# Patient Record
Sex: FEMALE | Race: WHITE | Employment: FULL TIME | URBAN - METROPOLITAN AREA
[De-identification: names, ages, dates, MRNs, and addresses within clinical notes are randomized per-mention and may not be internally consistent; named-entity substitution may affect disease eponyms.]

---

## 2019-05-18 PROBLEM — F41.9 ANXIETY: Status: ACTIVE | Noted: 2019-05-18

## 2019-05-18 PROBLEM — K21.9 GASTROESOPHAGEAL REFLUX DISEASE WITHOUT ESOPHAGITIS: Status: ACTIVE | Noted: 2019-05-18

## 2019-05-18 PROBLEM — Z80.0 FAMILY HISTORY OF COLON CANCER REQUIRING SCREENING COLONOSCOPY: Status: ACTIVE | Noted: 2019-05-18

## 2019-05-24 PROBLEM — E61.1 IRON DEFICIENCY: Status: ACTIVE | Noted: 2019-05-24

## 2019-09-06 ENCOUNTER — HOSPITAL ENCOUNTER (OUTPATIENT)
Facility: HOSPITAL | Age: 29
Setting detail: HOSPITAL OUTPATIENT SURGERY
Discharge: HOME | End: 2019-09-06
Attending: INTERNAL MEDICINE | Admitting: INTERNAL MEDICINE
Payer: COMMERCIAL

## 2019-09-06 VITALS
WEIGHT: 175 LBS | HEIGHT: 71 IN | RESPIRATION RATE: 14 BRPM | BODY MASS INDEX: 24.5 KG/M2 | HEART RATE: 48 BPM | OXYGEN SATURATION: 100 % | SYSTOLIC BLOOD PRESSURE: 108 MMHG | DIASTOLIC BLOOD PRESSURE: 67 MMHG

## 2019-09-06 DIAGNOSIS — Z12.11 SCREEN FOR COLON CANCER: ICD-10-CM

## 2019-09-06 DIAGNOSIS — Z80.0 FAMILY HISTORY OF COLON CANCER: ICD-10-CM

## 2019-09-06 LAB — B-HCG UR QL: NEGATIVE

## 2019-09-06 PROCEDURE — 81025 URINE PREGNANCY TEST: CPT

## 2019-09-06 PROCEDURE — 99153 MOD SED SAME PHYS/QHP EA: CPT | Performed by: INTERNAL MEDICINE

## 2019-09-06 PROCEDURE — 99152 MOD SED SAME PHYS/QHP 5/>YRS: CPT | Performed by: INTERNAL MEDICINE

## 2019-09-06 PROCEDURE — 0DJD8ZZ INSPECTION OF LOWER INTESTINAL TRACT, VIA NATURAL OR ARTIFICIAL OPENING ENDOSCOPIC: ICD-10-PCS | Performed by: INTERNAL MEDICINE

## 2019-09-06 RX ORDER — SODIUM CHLORIDE 0.9 % (FLUSH) 0.9 %
10 SYRINGE (ML) INJECTION AS NEEDED
Status: DISCONTINUED | OUTPATIENT
Start: 2019-09-06 | End: 2019-09-06

## 2019-09-06 RX ORDER — MIDAZOLAM HYDROCHLORIDE 1 MG/ML
INJECTION INTRAMUSCULAR; INTRAVENOUS
Status: DISCONTINUED | OUTPATIENT
Start: 2019-09-06 | End: 2019-09-06

## 2019-09-06 RX ORDER — SODIUM CHLORIDE, SODIUM LACTATE, POTASSIUM CHLORIDE, CALCIUM CHLORIDE 600; 310; 30; 20 MG/100ML; MG/100ML; MG/100ML; MG/100ML
INJECTION, SOLUTION INTRAVENOUS CONTINUOUS
Status: DISCONTINUED | OUTPATIENT
Start: 2019-09-06 | End: 2019-09-06

## 2019-09-06 RX ORDER — MIDAZOLAM HYDROCHLORIDE 1 MG/ML
1 INJECTION INTRAMUSCULAR; INTRAVENOUS EVERY 5 MIN PRN
Status: DISCONTINUED | OUTPATIENT
Start: 2019-09-06 | End: 2019-09-06

## 2019-09-06 NOTE — H&P
The H&P dated 8/28/19 by Page Officer, CARLOS was reviewed by Dionne Mendez MD today 9/6/19, the patient was examined and no significant changes have occurred in the patient's condition since the H&P was performed.   I discussed with the patient and/or legal re

## 2019-09-06 NOTE — OPERATIVE REPORT
Colonoscopy Operative Report    Pre-Operative Diagnosis: Family history of colon cancer (both parents diagnosed with colon cancer in early 46s)    Post-Operative Diagnosis:  -Normal colonoscopy     Procedure Performed: Pr friability  -No polyps, angiodysplasia, or diverticulosis  -No hemorrhoids   Impression:  -Normal colonoscopy  Recommendations:   -Repeat colonoscopy in 5 years due to significant family history of colon cancer (both parents with colon cancer, stage I and

## 2022-04-03 NOTE — ANESTHESIA POSTPROCEDURE EVALUATION
Patient: Omer Sun    Procedure Summary     Date: 04/03/22 Room / Location:     Anesthesia Start: 1135 Anesthesia Stop: 3674    Procedure: LABOR ANALGESIA Diagnosis:     Scheduled Providers:  Anesthesiologist: Nelly Dash MD    Anesthesia Type: epidural ASA Status: 2 - Emergent          Anesthesia Type: epidural    Vitals Value Taken Time   BP 99/58 04/03/22 1401   Temp  04/03/22 1407   Pulse 71 04/03/22 1401   Resp 12 04/03/22 1407   SpO2 99 % 04/03/22 1315   Vitals shown include unvalidated device data.     [facility] AN Post Evaluation:   Patient Evaluated in floor  Patient Participation: complete - patient participated  Level of Consciousness: awake and alert  Pain Score: 0  Pain Management: adequate  Airway Patency:patent  Dental exam unchanged from preop  Yes    Cardiovascular Status: acceptable  Respiratory Status: acceptable  Postoperative Hydration acceptable      Nikki Myers MD  4/3/2022 2:07 PM

## 2022-04-03 NOTE — PROGRESS NOTES
Pt is a 27year old female admitted to TR1/TR1-A. Patient presents with:  R/o Labor: ctx onset 0230, worsened at 0700, denies LOF/Bleeding, states +FM     Pt is  39w6d intra-uterine pregnancy. History obtained, consents signed. Oriented to room, staff, and plan of care.

## 2022-04-03 NOTE — PLAN OF CARE

## 2022-04-03 NOTE — PROGRESS NOTES
Pt is a 27year old female admitted to [address] [place] [address]. Patient presents with:  R/o Labor: ctx onset 0230, worsened at 0700, denies LOF/Bleeding, states +FM     Pt is  39w6d intra-uterine pregnancy. History obtained, consents signed. Oriented to room, staff, and plan of care.

## 2022-04-03 NOTE — PLAN OF CARE
Problem: COPING  Goal: Pt/Family able to verbalize concerns and demonstrate effective coping strategies  Description: INTERVENTIONS:  - Assist patient/family to identify coping skills, available support systems and cultural and spiritual values  - Provide emotional support, including active listening and acknowledgement of concerns of patient and caregivers  - Reduce environmental stimuli, as able  - Instruct patient/family in relaxation techniques, as appropriate  - Assess for spiritual and psychosocial needs and initiate Spiritual Care or Behavioral Health consult as needed  Outcome: Progressing     Problem: BIRTH - VAGINAL/ SECTION  Goal: Fetal and maternal status remain reassuring during the birth process  Description: INTERVENTIONS:  - Monitor vital signs  - Monitor fetal heart rate  - Monitor uterine activity  - Monitor labor progression (vaginal delivery)  - DVT prophylaxis (C/S delivery)  - Surgical antibiotic prophylaxis (C/S delivery)  Outcome: Progressing     Problem: PAIN - ADULT  Goal: Verbalizes/displays adequate comfort level or patient's stated pain goal  Description: INTERVENTIONS:  - Encourage pt to monitor pain and request assistance  - Assess pain using appropriate pain scale  - Administer analgesics based on type and severity of pain and evaluate response  - Implement non-pharmacological measures as appropriate and evaluate response  - Consider cultural and social influences on pain and pain management  - Manage/alleviate anxiety  - Utilize distraction and/or relaxation techniques  - Monitor for opioid side effects  - Notify MD/LIP if interventions unsuccessful or patient reports new pain  - Anticipate increased pain with activity and pre-medicate as appropriate  Outcome: Progressing     Problem: ANXIETY  Goal: Will report anxiety at manageable levels  Description: INTERVENTIONS:  - Administer medication as ordered  - Teach and rehearse alternative coping skills  - Provide emotional support with 1:1 interaction with staff  Outcome: Progressing     Problem: Patient Centered Care  Goal: Patient preferences are identified and integrated in the patient's plan of care  Description: Interventions:  - What would you like us to know as we care for you?   - Provide timely, complete, and accurate information to patient/family  - Incorporate patient and family knowledge, values, beliefs, and cultural backgrounds into the planning and delivery of care  - Encourage patient/family to participate in care and decision-making at the level they choose  - Honor patient and family perspectives and choices  Outcome: Progressing     Problem: Patient/Family Goals  Goal: Patient/Family Long Term Goal  Description: Patient's Long Term Goal:  Uncomplicated Vaginal Delivery    Interventions:  -Assessment  -Induction/Augmentation per protocol and MD order  -Education  -Intervention per protocol with education  -involve patient/family in POC  -See additional Care Plan goals for specific interventions    Outcome: Progressing  Goal: Patient/Family Short Term Goal  Description: Patient's Short Term Goal:  Comfort and Pain Control    Interventions:  -Non Pharmacological pain interventions  -IV/IM and epidural pain medication per physician order and patient's request  -Education  -Involve patient in POC     Outcome: Progressing

## 2022-04-03 NOTE — ANESTHESIA PROCEDURE NOTES
Labor Analgesia  Performed by: Ervin Farmer MD  Authorized by: Ervin Farmer MD       General Information and Staff    Start Time:  4/3/2022 11:38 AM  End Time:  4/3/2022 11:45 AM  Anesthesiologist:  Ervin Farmer MD  Performed by:   Anesthesiologist  Patient Location:  OB  Site Identification: surface landmarks    Reason for Block: labor epidural    Preanesthetic Checklist: patient identified, IV checked, site marked, risks and benefits discussed, monitors and equipment checked, pre-op evaluation, timeout performed, anesthesia consent and sterile technique used      Procedure Details    Patient Position:  Sitting  Prep: ChloraPrep    Monitoring:  Heart rate  Approach:  Midline    Epidural Needle    Injection Technique:  FATEMEH air  Needle Type:  Tuohy  Needle Gauge:  18 G  Needle Length:  3.5 in  Needle Insertion Depth:  12  Location:  L3-4    Spinal Needle      Catheter    Catheter Type:  Multi-orifice  Catheter Size:  20 G  Catheter at Skin Depth:  6  Test Dose:  Negative    Assessment  Sensory Level:  T4    Additional Comments

## 2022-04-03 NOTE — PROGRESS NOTES
Pt is a 27year old female admitted to [address] [place] [address]. Patient presents with:  R/o Labor: ctx onset 0230, worsened at 0700, denies LOF/Bleeding, states +FM     Pt is  39w6d intra-uterine pregnancy. History obtained, consents signed. Oriented to room, staff, and plan of care.

## 2022-04-03 NOTE — PROGRESS NOTES
Patient up to bathroom with assist x 2. Unable to void at this time. Bladder scan ~130cc. Patient transferred to mother/baby room 364 per wheelchair in stable condition with baby and personal belongings. Accompanied by significant other and staff. Report given to Yolette Keenan mother/baby RN.

## 2022-04-03 NOTE — L&D DELIVERY NOTE
Keron Hensley, Girl [[id number]]    Labor Events     labor?: No   steroids?: None  Antibiotics received during labor?: Yes  Antibiotics (enter # doses in comment): ampicillin  Rupture date/time: 4/3/2022 1120     Rupture type: SROM  Fluid color: Clear     [place] Presentation    Presentation: Vertex     Operative Delivery    Operative Vaginal Delivery: No            Shoulder Dystocia    Shoulder Dystocia: No     Anesthesia    Method: Epidural          [place] Delivery    Head delivery date/time: 4/3/2022 13:06:07   Delivery date/time:  4/3/22 13:06:14   Delivery type: Normal spontaneous vaginal delivery    Details:     Delivery location: delivery room  Delivery Room Temperature: 75     Delivery Providers    Delivering Clinician: Talbot Romberg, MD   Delivery personnel:  Provider Role   Chantal Gauthier RN Baby Nurse   Denice Walden RN Delivery Nurse         Cord    Vessels: 3 Vessels  Complications: None  Timed cord clamping: Yes  Time in sec: 61  Cord blood disposition: to lab  Gases sent?: No     Resuscitation    Method: None     [place] Measurements    No data filed     Placenta    Date/time: 4/3/2022 1315  Removal: Spontaneous  Appearance: Intact  Disposition: Pathology     Apgars    Living status: Living   Apgar Scoring Key:    0 1 2    Skin color Blue or pale Acrocyanotic Completely pink    Heart rate Absent <100 bpm >100 bpm    Reflex irritability No response Grimace Cry or active withdrawal    Muscle tone Limp Some flexion Active motion    Respiratory effort Absent Weak cry; hypoventilation Good, crying              1 Minute:  5 Minute:  10 Minute:  15 Minute:  20 Minute:    Skin color: 1  1       Heart rate: 2  2       Reflex irritablity: 2  2       Muscle tone: 2  2       Respiratory effort: 2  2       Total: 9  9          Apgars assigned by: Yoli Bradford RN  Burnside disposition: with mother     Skin to Skin    Skin to skin initiated date/time: 4/3/2022 1306  Skin to skin with: Mother     Vaginal Count    Initial count RN: Anitha Maldonado RN  Initial count Tech: Tisha Bah RN   Sponges   Sharps    Initial counts 10   0    Final counts           Delivery (Maternal)    Episiotomy: None  Perineal lacerations: 1st Repaired?: Yes   Vaginal laceration?: No    Cervical laceration?: No    Clitoral laceration?: No Repaired?: No           [facility]    Vaginal Delivery Note    Mayra Alvarez Patient Status:  Outpatient    6/15/1990 MRN [id number]   Location [address] [place] [address] Attending Ranulfo Soto MD   Hosp Day # 0 PCP Unknown Pcp     Delivery     Infant  Date of Delivery: 4/3/2022   Time of Delivery: 1:06 PM  Delivery Type: Normal spontaneous vaginal delivery    Infant Sex  Information for the patient's : Nayeli Looney Girl [[id number]]   female    Infant Birthweight  Information for the patient's : Nayeli Looney Girl [[id number]]   No birth weight on file. Presentation Vertex [1]  Position          Apgars:  1 minute: 9               5 minutes: 9                        10 minutes:      Placenta:  Date/Time of Delivery: 4/3/2022  1:15 PM   Delivery: spontaneous  Placenta to Pathology: yes    Umbilical Cord:  Cord Gases Submitted: no  Cord Blood/Tissue Collection: no  Cord Complications: none  Sponge and Needle Counts:  Verified    Maternal Anesthesia: epidural     Episiotomy/Laceration Repair  Laceration: perineal first degree    Delivery Complications  none    Neonatologist Present: no    Delivery Narrative: Patient pushed for 5 minutes prior to delivering a live female in ALEXEY position over intact perineum after nose & mouth bulb suctioned. Infant then delivered in total. Umbilical cord doubly clamped & cut. Infant handed to awaiting mother. First degree perineal  laceration repaired with 2-0 Vicryl. No cervical / periuretheral / sulcus lacerations. Placenta delivered spontaneously intact & normal in appearance with 3 vessel cord.  EBL 300cc. Fundus firm after delivery. Rectal exam WNL after repair. Mother and baby are doing well.       Leandro Og MD   4/3/2022  1:26 PM

## 2022-04-03 NOTE — PROGRESS NOTES
Received patient from L&D via wheelchair. ID bands verified and unit orientation discussed and plan of care agreed on. Vitals are stable and bleeding WNL. Patient is breastfeeding only and breastfeeding well. All questions answered. Family at the bedside.

## 2022-04-04 NOTE — LACTATION NOTE
This note was copied from a baby's chart. LACTATION NOTE - INFANT    Evaluation Type  Evaluation Type: Inpatient    Problems & Assessment  Problems Diagnosed or Identified: Shallow latch;Sleepy  Problems: comment/detail: LGA  Infant Assessment: Anterior fontanel soft and flat;Hunger cues present;Skin color: pink or appropriate for ethnicity  Muscle tone: Appropriate for GA    Feeding Assessment  Summary Current Feeding: Adlib;Breastfeeding with formula supplement  Last 24 hour feeding summary: Breastfeeding; sleepy at times. Formula supplement given x 1. Mom pumping  Breastfeeding Assessment: Assisted with breastfeeding w/mother's permission;Calm and ready to breastfeed;Sleepy infant, quickly pacifies; Tolerated feeding well;Coordinated suck/swallow;Sustained nutrititive latch w/audible swallows  Breastfeeding Positions: cross cradle;right breast  Latch: Repeated attempts, hold nipple in mouth, stimulate to suck  Audible Sucks/Swallows: Spontaneous and intermittent (24 hours old)  Type of Nipple: Everted (after stimulation)  Comfort (Breast/Nipple): Soft/non-tender  Hold (Positioning): Full assist, teach one side, mother does other, staff holds  [facility] Score: 8  Other (comment): Upon entering room, infant at breast. Non-nutritive sucking observed. Reviewed deep latch techniques. Infant relatched and more active sucking and swallowing observed. Mom has a history of history of low supply with previous baby, so she rented a Symphony pump prenatally and is pumping for 15 minutes after most feedings. Reviewed breastfeeding education. Encouraged to call lactation for assistance, as needed.

## 2022-04-04 NOTE — DISCHARGE SUMMARY
[facility]    Discharge Summary    Liborio Vargas Patient Status:  Inpatient    6/15/1990 MRN [id number]   Location [facility] 3SE Attending Dacia Hou MD   Hosp Day # 1 PCP Unknown Pcp     Date of Admission: 4/3/2022    Date of Discharge: 2022       Admission Diagnoses: pregnancy  Pregnancy   (normal spontaneous vaginal delivery)        Hospital Course:     [facility]: Estimated Date of Delivery: 22    Gestational Age: 37w11d    Date of Delivery:  4/3/22       Antepartum complications: none    Delivered By:  Loretta Braxton MD    Delivery Type:         Discharge Plan:   Discharge Condition: Good      Discharge medications:  Current Discharge Medication List    New Orders    acetaminophen 500 MG Oral Tab  Take 1 tablet (500 mg total) by mouth every 6 (six) hours as needed. ibuprofen 600 MG Oral Tab  Take 1 tablet (600 mg total) by mouth every 6 (six) hours as needed. Home Meds - Unchanged    PRENATAL 27-0.8 MG Oral Tab  Take 1 tablet by mouth daily. Prenatal Vit-DSS-Fe Cbn-FA (PRENATAL AD OR)  Take by mouth. Ferrous Sulfate 325 (65 Fe) MG Oral Tab  Take 1 tablet (325 mg total) by mouth daily with breakfast.    Pantoprazole Sodium 20 MG Oral Tab EC  Take 1 tablet (20 mg total) by mouth daily. Before meal                Discharge Diet: As tolerated    Discharge Activity: As tolerated    Follow up: Follow-up Information     Dacia Hou MD In 6 weeks.     Specialty: OBSTETRICS & GYNECOLOGY  Contact information:  [address]                                   Jame Diggs MD  2022

## 2022-04-04 NOTE — PLAN OF CARE
Problem: COPING  Goal: Pt/Family able to verbalize concerns and demonstrate effective coping strategies  Description: INTERVENTIONS:  - Assist patient/family to identify coping skills, available support systems and cultural and spiritual values  - Provide emotional support, including active listening and acknowledgement of concerns of patient and caregivers  - Reduce environmental stimuli, as able  - Instruct patient/family in relaxation techniques, as appropriate  - Assess for spiritual and psychosocial needs and initiate Spiritual Care or Behavioral Health consult as needed  Outcome: Progressing     Problem: BIRTH - VAGINAL/ SECTION  Goal: Fetal and maternal status remain reassuring during the birth process  Description: INTERVENTIONS:  - Monitor vital signs  - Monitor fetal heart rate  - Monitor uterine activity  - Monitor labor progression (vaginal delivery)  - DVT prophylaxis (C/S delivery)  - Surgical antibiotic prophylaxis (C/S delivery)  Outcome: Progressing     Problem: PAIN - ADULT  Goal: Verbalizes/displays adequate comfort level or patient's stated pain goal  Description: INTERVENTIONS:  - Encourage pt to monitor pain and request assistance  - Assess pain using appropriate pain scale  - Administer analgesics based on type and severity of pain and evaluate response  - Implement non-pharmacological measures as appropriate and evaluate response  - Consider cultural and social influences on pain and pain management  - Manage/alleviate anxiety  - Utilize distraction and/or relaxation techniques  - Monitor for opioid side effects  - Notify MD/LIP if interventions unsuccessful or patient reports new pain  - Anticipate increased pain with activity and pre-medicate as appropriate  Outcome: Progressing     Problem: ANXIETY  Goal: Will report anxiety at manageable levels  Description: INTERVENTIONS:  - Administer medication as ordered  - Teach and rehearse alternative coping skills  - Provide emotional support with 1:1 interaction with staff  Outcome: Progressing     Problem: Patient Centered Care  Goal: Patient preferences are identified and integrated in the patient's plan of care  Description: Interventions:  - What would you like us to know as we care for you?   - Provide timely, complete, and accurate information to patient/family  - Incorporate patient and family knowledge, values, beliefs, and cultural backgrounds into the planning and delivery of care  - Encourage patient/family to participate in care and decision-making at the level they choose  - Honor patient and family perspectives and choices  Outcome: Progressing     Problem: Patient/Family Goals  Goal: Patient/Family Long Term Goal  Description: Patient's Long Term Goal:  Uncomplicated Vaginal Delivery    Interventions:  -Assessment  -Induction/Augmentation per protocol and MD order  -Education  -Intervention per protocol with education  -involve patient/family in POC  -See additional Care Plan goals for specific interventions    Outcome: Progressing  Goal: Patient/Family Short Term Goal  Description: Patient's Short Term Goal:  Comfort and Pain Control    Interventions:  -Non Pharmacological pain interventions  -IV/IM and epidural pain medication per physician order and patient's request  -Education  -Involve patient in POC     Outcome: Progressing     Problem: POSTPARTUM  Goal: Long Term Goal:Experiences normal postpartum course  Description: INTERVENTIONS:  - Assess and monitor vital signs and lab values. - Assess fundus and lochia. - Provide ice/sitz baths for perineum discomfort. - Monitor healing of incision/episiotomy/laceration, and assess for signs and symptoms of infection and hematoma. - Assess bladder function and monitor for bladder distention.  - Provide/instruct/assist with pericare as needed. - Provide VTE prophylaxis as needed. - Monitor bowel function.  - Encourage ambulation and provide assistance as needed.   - Assess and monitor emotional status and provide social service/psych resources as needed. - Utilize standard precautions and use personal protective equipment as indicated. Ensure aseptic care of all intravenous lines and invasive tubes/drains.  - Obtain immunization and exposure to communicable diseases history. Outcome: Progressing  Goal: Optimize infant feeding at the breast  Description: INTERVENTIONS:  - Initiate breast feeding within first hour after birth. - Monitor effectiveness of current breast feeding efforts. - Assess support systems available to mother/family.  - Identify cultural beliefs/practices regarding lactation, letdown techniques, maternal food preferences. - Assess mother's knowledge and previous experience with breast feeding.  - Provide information as needed about early infant feeding cues (e.g., rooting, lip smacking, sucking fingers/hand) versus late cue of crying.  - Discuss/demonstrate breast feeding aids (e.g., infant sling, nursing footstool/pillows, and breast pumps). - Encourage mother/other family members to express feelings/concerns, and actively listen. - Educate father/SO about benefits of breast feeding and how to manage common lactation challenges. - Recommend avoidance of specific medications or substances incompatible with breast feeding.  - Assess and monitor for signs of nipple pain/trauma. - Instruct and provide assistance with proper latch. - Review techniques for milk expression (breast pumping) and storage of breast milk. Provide pumping equipment/supplies, instructions and assistance, as needed. - Encourage rooming-in and breast feeding on demand.  - Encourage skin-to-skin contact. - Provide LC support as needed. - Assess for and manage engorgement. - Provide breast feeding education handouts and information on community breast feeding support.    Outcome: Progressing  Goal: Establishment of adequate milk supply with medication/procedure interruptions  Description: INTERVENTIONS:  - Review techniques for milk expression (breast pumping). - Provide pumping equipment/supplies, instructions, and assistance until it is safe to breastfeed infant. Outcome: Progressing  Goal: Experiences normal breast weaning course  Description: INTERVENTIONS:  - Assess for and manage engorgement. - Instruct on breast care. - Provide comfort measures. Outcome: Progressing  Goal: Appropriate maternal -  bonding  Description: INTERVENTIONS:  - Assess caregiver- interactions. - Assess caregiver's emotional status and coping mechanisms. - Encourage caregiver to participate in  daily care. - Assess support systems available to mother/family.  - Provide /case management support as needed.   Outcome: Progressing

## 2022-04-04 NOTE — LACTATION NOTE
LACTATION NOTE - MOTHER      Evaluation Type: Inpatient    Problems identified  Problems identified: Knowledge deficit; Recent antibiotic use    Maternal history  Maternal history: Anemia; Anxiety;Depression    Breastfeeding goal  Breastfeeding goal: To maintain breast milk feeding per patient goal    Maternal Assessment  Bilateral Breasts: Symmetrical;Soft  Right Nipple: Everted  Prior breastfeeding experience (comment below): Multip;Pumped & bottle fed;Problems, continued  Prior BF experience: comment: low supply. Breastfeeding Assistance: Breastfeeding assistance provided with permission         Guidelines for use of:  Breast pump type: Other ([employer] Symphony)  Current use of pump[de-identified] Mom has a history of low supply with previous baby, so she rented a Symphony pump prenatally and is pumping for 15 minutes after most feedings. Other (comment): Reviewed breastfeeding education. Encouraged to call lactation for assistance, as needed.

## 2022-04-05 NOTE — PROGRESS NOTES
Discharge order received from MD.     Discharge instructions and medications reviewed with patient. ID band matched with baby band. Follow up instructions with OB given. Mother verbalizes understanding of instructions. Discharged in stable condition via wheelchair.

## 2022-04-05 NOTE — PLAN OF CARE
Problem: COPING  Goal: Pt/Family able to verbalize concerns and demonstrate effective coping strategies  Description: INTERVENTIONS:  - Assist patient/family to identify coping skills, available support systems and cultural and spiritual values  - Provide emotional support, including active listening and acknowledgement of concerns of patient and caregivers  - Reduce environmental stimuli, as able  - Instruct patient/family in relaxation techniques, as appropriate  - Assess for spiritual and psychosocial needs and initiate Spiritual Care or Behavioral Health consult as needed  Outcome: Completed     Problem: BIRTH - VAGINAL/ SECTION  Goal: Fetal and maternal status remain reassuring during the birth process  Description: INTERVENTIONS:  - Monitor vital signs  - Monitor fetal heart rate  - Monitor uterine activity  - Monitor labor progression (vaginal delivery)  - DVT prophylaxis (C/S delivery)  - Surgical antibiotic prophylaxis (C/S delivery)  Outcome: Completed     Problem: PAIN - ADULT  Goal: Verbalizes/displays adequate comfort level or patient's stated pain goal  Description: INTERVENTIONS:  - Encourage pt to monitor pain and request assistance  - Assess pain using appropriate pain scale  - Administer analgesics based on type and severity of pain and evaluate response  - Implement non-pharmacological measures as appropriate and evaluate response  - Consider cultural and social influences on pain and pain management  - Manage/alleviate anxiety  - Utilize distraction and/or relaxation techniques  - Monitor for opioid side effects  - Notify MD/LIP if interventions unsuccessful or patient reports new pain  - Anticipate increased pain with activity and pre-medicate as appropriate  Outcome: Completed     Problem: ANXIETY  Goal: Will report anxiety at manageable levels  Description: INTERVENTIONS:  - Administer medication as ordered  - Teach and rehearse alternative coping skills  - Provide emotional support with 1:1 interaction with staff  Outcome: Completed     Problem: Patient Centered Care  Goal: Patient preferences are identified and integrated in the patient's plan of care  Description: Interventions:  - What would you like us to know as we care for you?   - Provide timely, complete, and accurate information to patient/family  - Incorporate patient and family knowledge, values, beliefs, and cultural backgrounds into the planning and delivery of care  - Encourage patient/family to participate in care and decision-making at the level they choose  - Honor patient and family perspectives and choices  Outcome: Completed     Problem: Patient/Family Goals  Goal: Patient/Family Long Term Goal  Description: Patient's Long Term Goal:  Uncomplicated Vaginal Delivery    Interventions:  -Assessment  -Induction/Augmentation per protocol and MD order  -Education  -Intervention per protocol with education  -involve patient/family in POC  -See additional Care Plan goals for specific interventions    Outcome: Completed  Goal: Patient/Family Short Term Goal  Description: Patient's Short Term Goal:  Comfort and Pain Control    Interventions:  -Non Pharmacological pain interventions  -IV/IM and epidural pain medication per physician order and patient's request  -Education  -Involve patient in POC     Outcome: Completed     Problem: POSTPARTUM  Goal: Long Term Goal:Experiences normal postpartum course  Description: INTERVENTIONS:  - Assess and monitor vital signs and lab values. - Assess fundus and lochia. - Provide ice/sitz baths for perineum discomfort. - Monitor healing of incision/episiotomy/laceration, and assess for signs and symptoms of infection and hematoma. - Assess bladder function and monitor for bladder distention.  - Provide/instruct/assist with pericare as needed. - Provide VTE prophylaxis as needed. - Monitor bowel function.  - Encourage ambulation and provide assistance as needed.   - Assess and monitor emotional status and provide social service/psych resources as needed. - Utilize standard precautions and use personal protective equipment as indicated. Ensure aseptic care of all intravenous lines and invasive tubes/drains.  - Obtain immunization and exposure to communicable diseases history. Outcome: Completed  Goal: Optimize infant feeding at the breast  Description: INTERVENTIONS:  - Initiate breast feeding within first hour after birth. - Monitor effectiveness of current breast feeding efforts. - Assess support systems available to mother/family.  - Identify cultural beliefs/practices regarding lactation, letdown techniques, maternal food preferences. - Assess mother's knowledge and previous experience with breast feeding.  - Provide information as needed about early infant feeding cues (e.g., rooting, lip smacking, sucking fingers/hand) versus late cue of crying.  - Discuss/demonstrate breast feeding aids (e.g., infant sling, nursing footstool/pillows, and breast pumps). - Encourage mother/other family members to express feelings/concerns, and actively listen. - Educate father/SO about benefits of breast feeding and how to manage common lactation challenges. - Recommend avoidance of specific medications or substances incompatible with breast feeding.  - Assess and monitor for signs of nipple pain/trauma. - Instruct and provide assistance with proper latch. - Review techniques for milk expression (breast pumping) and storage of breast milk. Provide pumping equipment/supplies, instructions and assistance, as needed. - Encourage rooming-in and breast feeding on demand.  - Encourage skin-to-skin contact. - Provide LC support as needed. - Assess for and manage engorgement. - Provide breast feeding education handouts and information on community breast feeding support.    Outcome: Completed  Goal: Establishment of adequate milk supply with medication/procedure interruptions  Description: INTERVENTIONS:  - Review techniques for milk expression (breast pumping). - Provide pumping equipment/supplies, instructions, and assistance until it is safe to breastfeed infant. Outcome: Completed  Goal: Experiences normal breast weaning course  Description: INTERVENTIONS:  - Assess for and manage engorgement. - Instruct on breast care. - Provide comfort measures. Outcome: Completed  Goal: Appropriate maternal -  bonding  Description: INTERVENTIONS:  - Assess caregiver- interactions. - Assess caregiver's emotional status and coping mechanisms. - Encourage caregiver to participate in  daily care. - Assess support systems available to mother/family.  - Provide /case management support as needed.   Outcome: Completed

## 2022-04-05 NOTE — PLAN OF CARE
Problem: PAIN - ADULT  Goal: Verbalizes/displays adequate comfort level or patient's stated pain goal  Description: INTERVENTIONS:  - Encourage pt to monitor pain and request assistance  - Assess pain using appropriate pain scale  - Administer analgesics based on type and severity of pain and evaluate response  - Implement non-pharmacological measures as appropriate and evaluate response  - Consider cultural and social influences on pain and pain management  - Manage/alleviate anxiety  - Utilize distraction and/or relaxation techniques  - Monitor for opioid side effects  - Notify MD/LIP if interventions unsuccessful or patient reports new pain  - Anticipate increased pain with activity and pre-medicate as appropriate  Outcome: Progressing     Problem: POSTPARTUM  Goal: Long Term Goal:Experiences normal postpartum course  Description: INTERVENTIONS:  - Assess and monitor vital signs and lab values. - Assess fundus and lochia. - Provide ice/sitz baths for perineum discomfort. - Monitor healing of incision/episiotomy/laceration, and assess for signs and symptoms of infection and hematoma. - Assess bladder function and monitor for bladder distention.  - Provide/instruct/assist with pericare as needed. - Provide VTE prophylaxis as needed. - Monitor bowel function.  - Encourage ambulation and provide assistance as needed. - Assess and monitor emotional status and provide social service/psych resources as needed. - Utilize standard precautions and use personal protective equipment as indicated. Ensure aseptic care of all intravenous lines and invasive tubes/drains.  - Obtain immunization and exposure to communicable diseases history. Outcome: Progressing  Goal: Optimize infant feeding at the breast  Description: INTERVENTIONS:  - Initiate breast feeding within first hour after birth. - Monitor effectiveness of current breast feeding efforts.   - Assess support systems available to mother/family.  - Identify cultural beliefs/practices regarding lactation, letdown techniques, maternal food preferences. - Assess mother's knowledge and previous experience with breast feeding.  - Provide information as needed about early infant feeding cues (e.g., rooting, lip smacking, sucking fingers/hand) versus late cue of crying.  - Discuss/demonstrate breast feeding aids (e.g., infant sling, nursing footstool/pillows, and breast pumps). - Encourage mother/other family members to express feelings/concerns, and actively listen. - Educate father/SO about benefits of breast feeding and how to manage common lactation challenges. - Recommend avoidance of specific medications or substances incompatible with breast feeding.  - Assess and monitor for signs of nipple pain/trauma. - Instruct and provide assistance with proper latch. - Review techniques for milk expression (breast pumping) and storage of breast milk. Provide pumping equipment/supplies, instructions and assistance, as needed. - Encourage rooming-in and breast feeding on demand.  - Encourage skin-to-skin contact. - Provide LC support as needed. - Assess for and manage engorgement. - Provide breast feeding education handouts and information on community breast feeding support. Outcome: Progressing  Goal: Establishment of adequate milk supply with medication/procedure interruptions  Description: INTERVENTIONS:  - Review techniques for milk expression (breast pumping). - Provide pumping equipment/supplies, instructions, and assistance until it is safe to breastfeed infant. Outcome: Progressing  Goal: Experiences normal breast weaning course  Description: INTERVENTIONS:  - Assess for and manage engorgement. - Instruct on breast care. - Provide comfort measures. Outcome: Progressing  Goal: Appropriate maternal -  bonding  Description: INTERVENTIONS:  - Assess caregiver- interactions. - Assess caregiver's emotional status and coping mechanisms.   - Encourage caregiver to participate in  daily care. - Assess support systems available to mother/family.  - Provide /case management support as needed.   Outcome: Progressing

## 2022-09-29 NOTE — TELEPHONE ENCOUNTER
Called placed today to [facility] for preauth of Vyvanse Rx per Dr Verona Amin for patient w/ Binge eating disorder (Dx: F50.81)    Prior Authorization obtained/ approved from [facility] for Vyvanse 30mg every day #90 tabs. Case ID# [id number]  Covg Dates: 8/30/2022- 9/29/2023    Per [employer] rep, patient will receive an approval telephone call and a fax copy of the prior auth will be faxed to this office.

## 2023-10-05 NOTE — IMAGING NOTE
OB ULTRASOUND REPORT    Encounter for a limited ultrasound at the request of Dr. Renu Burroughs due to possible ROM. See imaging tab for complete ultrasound report or in PACS    Ultrasound Findings:  Single IUP in cephalic presentation. Placenta is posterior, high. A 3 vessel cord is noted. Cardiac activity is present at 143 bpm  MVP is 7.3 cm . MIAH 23.4 cm  Extensive amnion-chorion separation which consistent with small perforation in the membranes. IMPRESSION:  1.  IUP at 37w6d  2. Normal  MIAH  3. Extensive amnion-chorion separation consistent with small perforation in the membrane also known as a \"high-leak\". In light of the patient history, I recommend managing her pregnancy as spontaneous rupture of membranes and moving forward with delivery. I called Dr. Renu Burroughs to inform him of the findings. This was an ultrasound only encounter (no physician visit). The ultrasound was read by Dr. Renay Hollingsworth and the report was sent to Dr. Renu Burroughs to discuss with the patient.

## 2023-10-05 NOTE — ANESTHESIA PROCEDURE NOTES
Labor Analgesia    Date/Time: 10/5/2023 6:42 PM    Performed by: Laron Manning MD  Authorized by: Laron Manning MD      General Information and Staff    Start Time:  10/5/2023 6:42 PM  End Time:  10/5/2023 6:57 PM  Anesthesiologist:  Laron Manning MD  Performed by:   Anesthesiologist  Patient Location:  OB  Reason for Block: labor epidural    Preanesthetic Checklist: patient identified, IV checked, site marked, risks and benefits discussed, monitors and equipment checked, pre-op evaluation, timeout performed, anesthesia consent and sterile technique used      Procedure Details    Patient Position:  Sitting  Prep: ChloraPrep    Monitoring:  Heart rate  Approach:  Midline    Epidural Needle    Injection Technique:  FATEMEH air  Needle Type:  Tuohy  Needle Gauge:  18 G  Needle Length:  3.5 in  Location:  L2-3    Spinal Needle      Catheter    Catheter Type:  Multi-orifice  Catheter Size:  20 G  Test Dose:  Negative    Assessment      Additional Comments

## 2023-10-05 NOTE — PROGRESS NOTES
Pt is a 28year old female admitted to TR1/TR1-A. Patient presents with:  R/o Rom     Pt is  37w6d intra-uterine pregnancy. History obtained, consents signed. Oriented to room, staff, and plan of care.

## 2023-10-06 NOTE — ANESTHESIA POSTPROCEDURE EVALUATION
Patient: Ayde Pratt    Procedure Summary       Date: 10/05/23 Room / Location:     Anesthesia Start: 7287 Anesthesia Stop: 2159    Procedure: LABOR ANALGESIA Diagnosis:     Scheduled Providers:  Anesthesiologist: Robert Navarro MD    Anesthesia Type: epidural ASA Status: 2            Anesthesia Type: epidural    Vitals Value Taken Time   /56 10/05/23 2247   Temp  10/06/23 0408   Pulse 72 10/05/23 2247   Resp  10/06/23 0408   SpO2 98 % 10/05/23 2215       [facility] AN Post Evaluation:   Patient Evaluated in floor  Patient Participation: complete - patient participated  Level of Consciousness: awake and alert  Pain Management: adequate  Airway Patency:patent  Dental exam unchanged from preop  Yes    Cardiovascular Status: acceptable  Respiratory Status: acceptable  Postoperative Hydration acceptable      Sobia Doherty MD  10/6/2023 4:08 AM

## 2023-10-06 NOTE — PLAN OF CARE
Problem: Patient Centered Care  Goal: Patient preferences are identified and integrated in the patient's plan of care  Description: Interventions:  - What would you like us to know as we care for you?   - Provide timely, complete, and accurate information to patient/family  - Incorporate patient and family knowledge, values, beliefs, and cultural backgrounds into the planning and delivery of care  - Encourage patient/family to participate in care and decision-making at the level they choose  - Honor patient and family perspectives and choices  Outcome: Progressing     Problem: POSTPARTUM  Goal: Long Term Goal:Experiences normal postpartum course  Description: INTERVENTIONS:  - Assess and monitor vital signs and lab values. - Assess fundus and lochia. - Provide ice/sitz baths for perineum discomfort. - Monitor healing of incision/episiotomy/laceration, and assess for signs and symptoms of infection and hematoma. - Assess bladder function and monitor for bladder distention.  - Provide/instruct/assist with pericare as needed. - Provide VTE prophylaxis as needed. - Monitor bowel function.  - Encourage ambulation and provide assistance as needed. - Assess and monitor emotional status and provide social service/psych resources as needed. - Utilize standard precautions and use personal protective equipment as indicated. Ensure aseptic care of all intravenous lines and invasive tubes/drains.  - Obtain immunization and exposure to communicable diseases history. Outcome: Progressing  Goal: Optimize infant feeding at the breast  Description: INTERVENTIONS:  - Initiate breast feeding within first hour after birth. - Monitor effectiveness of current breast feeding efforts. - Assess support systems available to mother/family.  - Identify cultural beliefs/practices regarding lactation, letdown techniques, maternal food preferences.   - Assess mother's knowledge and previous experience with breast feeding.  - Provide information as needed about early infant feeding cues (e.g., rooting, lip smacking, sucking fingers/hand) versus late cue of crying.  - Discuss/demonstrate breast feeding aids (e.g., infant sling, nursing footstool/pillows, and breast pumps). - Encourage mother/other family members to express feelings/concerns, and actively listen. - Educate father/SO about benefits of breast feeding and how to manage common lactation challenges. - Recommend avoidance of specific medications or substances incompatible with breast feeding.  - Assess and monitor for signs of nipple pain/trauma. - Instruct and provide assistance with proper latch. - Review techniques for milk expression (breast pumping) and storage of breast milk. Provide pumping equipment/supplies, instructions and assistance, as needed. - Encourage rooming-in and breast feeding on demand.  - Encourage skin-to-skin contact. - Provide LC support as needed. - Assess for and manage engorgement. - Provide breast feeding education handouts and information on community breast feeding support. Outcome: Progressing  Goal: Establishment of adequate milk supply with medication/procedure interruptions  Description: INTERVENTIONS:  - Review techniques for milk expression (breast pumping). - Provide pumping equipment/supplies, instructions, and assistance until it is safe to breastfeed infant. Outcome: Progressing  Goal: Experiences normal breast weaning course  Description: INTERVENTIONS:  - Assess for and manage engorgement. - Instruct on breast care. - Provide comfort measures. Outcome: Progressing  Goal: Appropriate maternal -  bonding  Description: INTERVENTIONS:  - Assess caregiver- interactions. - Assess caregiver's emotional status and coping mechanisms. - Encourage caregiver to participate in  daily care. - Assess support systems available to mother/family.  - Provide /case management support as needed.   Outcome: Progressing

## 2023-10-06 NOTE — PROGRESS NOTES
Patient up to bathroom with assist x 2. Unable to void at this time. Patient transferred to mother/baby room 369  per wheelchair in stable condition with baby and personal belongings. Accompanied by significant other and staff. Report given to Nicolemother/baby RN.

## 2023-10-06 NOTE — L&D DELIVERY NOTE
Jacquelin Holloway [[id number]]      Labor Events     labor?: No   steroids?: None  Rupture date/time: 10/5/2023 0200     Rupture type: SROM  Fluid color: Clear  Labor type: Spontaneous Onset of Labor  Augmentation: Oxytocin  Indications for augmentation: Ineffective Contraction Pattern       Labor Event Times    Labor onset date/time: 10/5/2023 0900  Dilation complete date/time: 10/5/2023 2148       [place] Presentation    Presentation: Vertex  Position: Occiput Anterior       Operative Delivery    Operative Vaginal Delivery: No                      Shoulder Dystocia    Shoulder Dystocia: No             Anesthesia    Method: None              [place] Delivery      Head delivery date/time: 10/5/2023 21:56:51   Delivery date/time:  10/5/23 21:57:55   Delivery type: Normal spontaneous vaginal delivery    Details:     Delivery location: delivery room  Delivery Room Temperature: 72       Delivery Providers    Delivering Clinician: Jonathan Fisher MD   Delivery personnel:  Provider Role   Madelyn Stanley, RN Baby Nurse   Jorden Kinsey, RN Delivery Nurse             Cord    Vessels: 3 Vessels  Complications: Nuchal  # of loops: 1  Timed cord clamping: No  Cord blood disposition: to lab  Gases sent?: No       Resuscitation    Method: None        Measurements    No data filed       Placenta    Date/time: 10/5/2023 2159  Removal: Spontaneous  Appearance: Intact  Disposition: Discarded       Apgars    No data filed       Skin to Skin    Skin to skin initiated date/time: 10/5/2023 2203  Skin to skin with:  Mother       Vaginal Count    No data filed       Delivery (Maternal)    Episiotomy: None                [facility]    Vaginal Delivery Note    Brandan West Patient Status:  Inpatient    6/15/1990 MRN [id number]   Location [facility]  Attending Jonathan Fisher MD   Hosp Day # 0 PCP Aron Keyes, DO     Delivery     Infant  Date of Delivery: 10/5/2023   Time of Delivery: 9:57 PM  Delivery Type: Normal spontaneous vaginal delivery    Infant Sex/Birthweight: male No birth weight on file.      Presentation Vertex [1]  Position   Occiput [1] Anterior [1]    Apgars:  1 minute:                 5 minutes:                          10 minutes:      Placenta  Date/Time of Delivery: 10/5/2023  9:59 PM   Delivery: spontaneous  Placenta to Pathology: no  Cord Gases Submitted: no  Cord Blood Collection: no  Cord Tissue Collection: no  Cord Complications: single nuchal  Sponge and Needle Counts:  Verified yes    Maternal Anesthesia: epidural   Episiotomy/Laceration Repair  Laceration: perineal small second degree    Delivery Complications  none    Neonatologist Present: no  Delivery Comment:  male    Intake/Output   EBL:  300ml      Quinton Finch MD   10/5/2023  10:12 PM

## 2023-10-06 NOTE — PLAN OF CARE
Problem: Patient Centered Care  Goal: Patient preferences are identified and integrated in the patient's plan of care  Description: Interventions:  - What would you like us to know as we care for you?   - Provide timely, complete, and accurate information to patient/family  - Incorporate patient and family knowledge, values, beliefs, and cultural backgrounds into the planning and delivery of care  - Encourage patient/family to participate in care and decision-making at the level they choose  - Honor patient and family perspectives and choices  Outcome: Progressing     Problem: Patient/Family Goals  Goal: Patient/Family Long Term Goal  Description: Patient's Long Term Goal:     Interventions:  -   - See additional Care Plan goals for specific interventions  Outcome: Progressing  Goal: Patient/Family Short Term Goal  Description: Patient's Short Term Goal:    Interventions:   -   - See additional Care Plan goals for specific interventions  Outcome: Progressing

## 2023-10-10 NOTE — TELEPHONE ENCOUNTER
Hortencia called lactation concerned re: low milk supply - 5 days post partum. H/O low supply with first two children, \"trying to do everything I  can to get my milk in better/more\". Using a hospital grade [employer] breast pump at home after latched feedings which are going well and supplementing the expressed milk, adding some formula at night. Getting about 1 ounce on day 5 post partum. Asking re: galactagogues, reviewed different products - referred to [facility] [address] [place] [address] for some options and enc to talk with HCP. Also discussed prescription Reglan that she may opt to talk to her OB about prescribing, however cautioned re: the side effects. History notes anxiety, Jesus Rodrigues explains that was 11 years ago when her mother  and she was 20years old at the time, denies any other h/o of anxiety or depression. Aware it is contraindicated for people with anxiety and depression. Discussed having thyroid level checked as she said she was told it was \"sluggish\" once before. Enc to keep up the diligence, IF opting to get Reglan prescribed needs lactation and OB follow through, states will call for an appointment. Denies further questions.

## 2023-10-10 NOTE — TELEPHONE ENCOUNTER
From: Anna Urbina  To: Kevin Shaw [facility]  Sent: 10/9/2023 2:08 PM CDT  Subject: Prescription    Hi! I recently had our third baby and while in the hospital I was chatting with Lactation and we were discussing my history of being a low supplier and found it hard to feed my children. I believe I also chatted about with with my OB at a visit in the office, but is there a prescribed medication I can take to boost supply? I am happy to come in for a visit or do the blood work necessary. Lactation gave me a list of things that can be checked via blood to find the root cause of being an undersupplier. I am seeing the same treads in my supply output for this baby as my previous two.       Thanks,   [employer]

## 2024-01-03 NOTE — PROGRESS NOTES
Subjective:   Whit Rios is a 33 year old female who presents for Physical     33-year-old female coming in for routine physical.  Was being followed by previous PCP for weight gain and binge eating disorder, was started on Vyvanse with a good response for 3 to 4-month then discontinued due to pregnancy.  While on medication noted weight loss and improved mental clarity.  Patient is an athlete.  At this time eats 2-3 meals a day however mentions that she snacks frequently with binges at night.  Has low breastmilk supply for which she followed up with OB  and was prescribed medicine with not much improvement.  Will be starting work in the next week and will stop breast-feeding then.  Baby is mostly formula fed at this time.  Denies feeling down, depressed or hopeless.  Denies having bad thoughts towards baby.    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  Patient was advised by  at [facility] to get testing done if considering future babies.    Patient would like to get skin screening.  Brother with early stages of melanoma.  Patient grew up as a  and was exposed to sun    Family history of colon cancer in mother and father.  Mother passed away.  Patient had her first colonoscopy at 29 years old with advised to repeat in 5 years.    Separately mentions swelling/lump on right upper eyelid that was noted around [place].  Did warm compress with improvement however still feels lump.    No family history of heart disease.    History/Other:    Chief Complaint Reviewed and Verified  No Further Nursing Notes to   Review  Tobacco Reviewed  Allergies Reviewed  Medications Reviewed    Problem List Reviewed  Medical History Reviewed  Surgical History   Reviewed  Family History Reviewed  Social History Reviewed         Tobacco:  She has never smoked tobacco.    Current Outpatient Medications   Medication Sig Dispense Refill    lisdexamfetamine  (VYVANSE) 30 MG Oral Cap Take 1 capsule (30 mg total) by mouth every morning. Start medication only after you are no longer breast-feeding. 30 capsule 0         Review of Systems:  Review of Systems   Constitutional:  Negative for chills, diaphoresis and fever.   HENT:  Negative for congestion, ear discharge, ear pain, sinus pressure, sinus pain and sore throat.         Lump on right upper eyelid.   Eyes:  Negative for pain and discharge.   Respiratory:  Negative for cough, chest tightness, shortness of breath and wheezing.    Cardiovascular:  Negative for chest pain and palpitations.   Gastrointestinal:  Negative for abdominal pain, diarrhea, nausea and vomiting.   Endocrine: Negative for cold intolerance and heat intolerance.   Genitourinary:  Negative for dysuria, flank pain, frequency and urgency.   Musculoskeletal:  Negative for joint swelling.   Skin:  Negative for rash.   Neurological:  Negative for dizziness, syncope and headaches.   Psychiatric/Behavioral:  Negative for confusion and hallucinations.        Objective:   /70   Pulse 60   Ht 5' 11\" (1.803 m)   Wt 217 lb 6.4 oz (98.6 kg)   LMP 01/13/2023 (Exact Date)   SpO2 95%   BMI 30.32 kg/m²  Estimated body mass index is 30.32 kg/m² as calculated from the following:    Height as of this encounter: 5' 11\" (1.803 m).    Weight as of this encounter: 217 lb 6.4 oz (98.6 kg).  Physical Exam  Constitutional:       General: She is not in acute distress.     Appearance: Normal appearance. She is obese. She is not ill-appearing or toxic-appearing.   HENT:      Head: Normocephalic and atraumatic.      Right Ear: Tympanic membrane and ear canal normal.      Left Ear: Tympanic membrane and ear canal normal.      Mouth/Throat:      Mouth: Mucous membranes are moist.      Pharynx: Oropharynx is clear. No oropharyngeal exudate or posterior oropharyngeal erythema.   Eyes:      Extraocular Movements: Extraocular movements intact.      Pupils: Pupils are equal,  round, and reactive to light.      Comments: Chalazion on right upper medial eyelid.  No overlying erythema or tenderness.   Cardiovascular:      Rate and Rhythm: Normal rate and regular rhythm.      Heart sounds: Normal heart sounds. No murmur heard.     No gallop.   Pulmonary:      Effort: Pulmonary effort is normal. No respiratory distress.      Breath sounds: Normal breath sounds. No stridor. No wheezing, rhonchi or rales.   Abdominal:      General: Bowel sounds are normal.      Palpations: Abdomen is soft.      Tenderness: There is no abdominal tenderness. There is no right CVA tenderness, left CVA tenderness or guarding.   Musculoskeletal:         General: No swelling.      Cervical back: Normal range of motion and neck supple. No rigidity or tenderness.      Right lower leg: No edema.      Left lower leg: No edema.   Skin:     General: Skin is warm and dry.   Neurological:      General: No focal deficit present.      Mental Status: She is alert and oriented to person, place, and time. Mental status is at baseline.      Cranial Nerves: No cranial nerve deficit.      Sensory: No sensory deficit.      Motor: Motor function is intact. No weakness.      Gait: Gait normal.   Psychiatric:         Mood and Affect: Mood normal.         Behavior: Behavior normal.         Thought Content: Thought content normal. Thought content does not include homicidal or suicidal ideation. Thought content does not include homicidal or suicidal plan.         Judgment: Judgment normal.         Assessment & Plan:   1. Routine medical exam (Primary)  -Healthy diet and lifestyle.  -Weight loss.  -Exercise as tolerated.  -Dental exam every 6 months or as recommended by dentist.  -Eye exams annually, at least every 2 years or as recommended by specialist.  -     CBC, Platelet; No Differential; Future; Expected date: 01/03/2024  -     Comp Metabolic Panel (14); Future; Expected date: 01/03/2024  -     Hemoglobin A1C; Future; Expected date:  2024  -     Lipid Panel; Future; Expected date: 2024  -     TSH W Reflex To Free T4; Future; Expected date: 2024  2. Binge eating  Diagnosed by previous PCP.  Used to be on Vyvanse with good outcome.  -Medication risks discussed.  Patient to stop breast-feeding prior to initiation.  -     Lisdexamfetamine Dimesylate; Take 1 capsule (30 mg total) by mouth every morning. Start medication only after you are no longer breast-feeding.  Dispense: 30 capsule; Refill: 0  3. High risk medication use  We have discussed the proper use, risks and benefits of patient's medication(s). The patient  is aware of the risks of taking the above medication and indicates understanding of these risks and agrees to the plan.  EKG in office sinus bradycardia at 48 bpm (patient is an athlete).  -Patient to stop breast-feeding prior to initiation of medication.  -     ELECTROCARDIOGRAM, COMPLETE  -     Lisdexamfetamine Dimesylate; Take 1 capsule (30 mg total) by mouth every morning. Start medication only after you are no longer breast-feeding.  Dispense: 30 capsule; Refill: 0  4. Obesity, Class I, BMI 30-34.9  -Medication risks discussed.  Patient to stop breast-feeding prior to initiation of medication.  -     Lisdexamfetamine Dimesylate; Take 1 capsule (30 mg total) by mouth every morning. Start medication only after you are no longer breast-feeding.  Dispense: 30 capsule; Refill: 0  5. Encounter for screening for malignant neoplasm of skin  -     Derm Referral - In Network  6. Family history of colon cancer  In both parents.  -     Gastro Referral - In Network  7. Chalazion of right upper eyelid  -Warm, moist compresses on the affected areas (for 5 to 10 minutes four times a day) in order to facilitate drainage   -If not resolving, to follow-up with ophthalmology.  -     Ophthalmology Referral - In Network  8. 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  Patient was  advised by  at [facility] to get testing done if considering future babies.  -Reviewed lab information with patient on her phone.  No PCP order seems to be required.  Patient will verify with the lab and follow-up if needs an order.        Return in about 3 months (around 4/3/2024).    Alphonse Sinclair MD, 1/3/2024, 8:07 AM

## 2024-01-05 NOTE — TELEPHONE ENCOUNTER
PA for Vyvanse 30 mg sent to insurance per pt's request for brand name.     Whit Rios (Key: [id number]) sent to the insurance plan.

## 2024-02-14 NOTE — TELEPHONE ENCOUNTER
A refill request was received for:  Requested Prescriptions     Pending Prescriptions Disp Refills    lisdexamfetamine (VYVANSE) 30 MG Oral Cap 30 capsule 0     Sig: Take 1 capsule (30 mg total) by mouth every morning. Start medication only after you are no longer breast-feeding.     Last refill date:  1/15/24   Qty: 30 capsule   Dx: ADHD   Last office visit: 1/3/24   When is follow up due: 4/3/24       Future Appointments   Date Time Provider Department Center   3/4/2024  9:00 AM Zoraida Latham PA-C [id number]

## 2024-02-14 NOTE — TELEPHONE ENCOUNTER
Patient is asking that her Vyvanse be sent to her local [facility] at [address] [place]      Please let patient know when sent.

## 2024-03-04 NOTE — PATIENT INSTRUCTIONS
1. Schedule colonoscopy with Dr. Ruelas or Dr. Rocha mac or IV  [Diagnosis: crc screening, family history of screening]    2.  bowel prep from pharmacy ([facility] suprep)    3. Hold Vyvanse day before and day of procedure.     4. Read all bowel prep instructions carefully. Bowel prep instructions can also be found online at:  [email or web address]/giprep     5. AVOID seeds, nuts, popcorn, raw fruits and vegetables for 3 days before procedure    6. You MAY need to go for COVID testing 72 hours before procedure. The testing team will call you a few days before your procedure to discuss with you if testing is required. If you are asked to go for COVID testing and do not completed the test, the procedure cannot be performed.     7. If you start any NEW medication after your visit today, please notify us. Certain medications (like iron or weight loss medications) will need to be held before the procedure, or the procedure cannot be performed safely.

## 2024-03-04 NOTE — TELEPHONE ENCOUNTER
Scheduled for:  Colonoscopy [id number]  Provider Name:  Dr. Ruelas  Date:  09/20/2024  Location:[facility]  Sedation:  MAC  Time: 11:15am (Patient is aware arrival time is at 10:15am)  Prep:  Trilyte Prep Instructions Given At The Office Visit.    Meds/Allergies Reconciled?:  Zoraida Latham PA-C Reviewed  Diagnosis with codes:  Colon Screening Z12.11/ Family Hx of Colon Screening Z80.0  Was patient informed to call insurance with codes (Y/N):  Yes  Referral sent?:  Referral was sent at the time of electronic surgical scheduling.  EM or [facility] notified?:  I sent an electronic request to Endo Scheduling and received a confirmation today.  Medication Orders: Patient is aware to Hold Vyvanse day before and day of procedure  Pt is aware to NOT take iron pills, herbal meds and diet supplements for 7 days before exam. Also to NOT take any form of alcohol, recreational drugs and any forms of ED meds 24 hours before exam.   Misc Orders:       Further instructions given by staff:  I provide prep instructions to patient at the time of the appointment and reviewed date, time and location, she verbalized that she understood and is aware to call if she has any questions.    Patient was informed about the new cancellation policy for his/her procedure. Patient was also given a copy of the cancellation policy at the time of the appointment and verbalized understanding.

## 2024-03-04 NOTE — H&P
[facility] - Gastroenterology                                                                                                               Reason for consult:   Chief Complaint   Patient presents with    Consult     Family HX of Colon Cancer; Last CLN 2019       Requesting physician or provider: Alphonse Sinclair MD      HPI:   Whit Rios is a 33 year old year-old female with history of hypercholesterolemia, anxiety, GERD, family history of colon cancer who presents for crc screening.     she moves her bowels one time every 2-3 days. she denies straining and/or incomplete evacuation.  she denies brbpr and/or melena. Tries to eat high fiber. Drinking a lot of water.     she denies acid reflux and/or heartburn. she denies dysphagia, odynophagia and/or globus. she denies abdominal pain. she denies nausea and/or vomiting.  she denies recent change in appetite and/or unintentional weight loss. she denies bloating.    Most recent labs 1/3/2024, no anemia noted, normal kidney and liver function.     NSAIDS: PRN  Tobacco: none  Alcohol: none  Marijuana: none  Illicit drugs: none    FH GI malignancy- mother and father colon cancer (early 50s)  FH celiac dz- none  FH liver dz- none  FH IBD- none    No history of adverse reaction to sedation  No AMPARO  No anticoagulants  No pacemaker/defibrillator  No pain medications and/or sleep aides      Last colonoscopy:9/6/2019- normal colonoscopy, recall for 5 years due to family history with both parents in early 50's  Last EGD: none    Wt Readings from Last 6 Encounters:   03/04/24 217 lb (98.4 kg)   01/03/24 217 lb 6.4 oz (98.6 kg)   10/05/23 220 lb (99.8 kg)   02/13/23 177 lb 6.4 oz (80.5 kg)   02/01/23 175 lb 6.4 oz (79.6 kg)   09/21/22 201 lb (91.2 kg)        History, Medications, Allergies, ROS:      Past Medical History:   Diagnosis Date    Anemia     PT TOOK IRON PILLS; PRIOR TO PREGNANCY- RESOLVED    Anxiety state     Depression      Hypercholesterolemia 1/9/2024    Nausea & vomiting       Past Surgical History:   Procedure Laterality Date    COLONOSCOPY N/A 9/6/2019    Procedure: COLONOSCOPY;  Surgeon: Florina Barrett MD;  Location: [facility] ENDOSCOPY    COLONOSCOPY        Family Hx:   Family History   Problem Relation Age of Onset    Kidney Disease Father         dialysis    Colon Cancer Father     Diabetes Father     Renal Disease Father     Colon Cancer Mother     No Known Problems Sister     No Known Problems Sister     No Known Problems Brother       Social History:   Social History     Socioeconomic History    Marital status:    Occupational History    Occupation: insurance   Tobacco Use    Smoking status: Never    Smokeless tobacco: Never   Vaping Use    Vaping Use: Never used   Substance and Sexual Activity    Alcohol use: Not Currently    Drug use: Never    Sexual activity: Yes     Partners: Male     Birth control/protection: Pill   Other Topics Concern    Exercise Yes   Social History Narrative    ** Merged History Encounter **          Social Determinants of Health     Financial Resource Strain: Low Risk  (10/5/2023)    Financial Resource Strain     Difficulty of Paying Living Expenses: Not hard at all     Med Affordability: No   Food Insecurity: No Food Insecurity (10/5/2023)    Food Insecurity     Food Insecurity: Never true   Transportation Needs: No Transportation Needs (10/5/2023)    Transportation Needs     Lack of Transportation: No   Stress: No Stress Concern Present (10/5/2023)    Stress     Feeling of Stress : No   Housing Stability: Low Risk  (10/5/2023)    Housing Stability     Housing Instability: No        Medications (Active prior to today's visit):  Current Outpatient Medications   Medication Sig Dispense Refill    Na Sulfate-K Sulfate-Mg Sulf (SUPREP BOWEL PREP KIT) 17.5-3.13-1.6 GM/177ML Oral Solution Take prep as directed by gastro office. May substitute with Trilyte/generic equivalent if needed. 1 each 0     lisdexamfetamine (VYVANSE) 30 MG Oral Cap Take 1 capsule (30 mg total) by mouth every morning. Start medication only after you are no longer breast-feeding. 30 capsule 0       Allergies:  No Known Allergies    ROS:   CONSTITUTIONAL: negative for fevers, chills, sweats and weight loss  EYES Negative for red eyes, yellow eyes, changes in vision  HEENT: Negative for dysphagia and hoarseness  RESPIRATORY: Negative for cough and shortness of breath  CARDIOVASCULAR: Negative for chest pain, palpitations  GASTROINTESTINAL: See HPI  GENITOURINARY: Negative for dysuria and frequency  MUSCULOSKELETAL: Negative for arthralgias and myalgias  NEUROLOGICAL: Negative for dizziness and headaches  BEHAVIOR/PSYCH: Negative for anxiety and poor appetite    PHYSICAL EXAM:   Blood pressure 103/69, pulse 73, height 5' 11\" (1.803 m), weight 217 lb (98.4 kg), last menstrual period 01/13/2023, currently breastfeeding.    GEN: WD/WN, NAD  HEENT: Supple symmetrical, trachea midline  CV: RRR, the extremities are warm and well perfused   LUNGS: No increased work of breathing  ABDOMEN: No scars, normal bowel sounds, soft, non-tender, non-distended no rebound or guarding, no masses, no hepatomegaly  MSK: No redness, no warmth, no swelling of joints  SKIN: No jaundice, no erythema, no rashes  HEMATOLOGIC: No bleeding, no bruising  NEURO: Alert and interactive, normal gait    Labs/Imaging/Procedures:     Patient's pertinent labs and imaging were reviewed and discussed with patient today.     Lab Results   Component Value Date    WBC 5.4 01/03/2024    RBC 4.84 01/03/2024    HGB 14.3 01/03/2024    HCT 42.1 01/03/2024    MCV 87.0 01/03/2024    MCH 29.5 01/03/2024    MCHC 34.0 01/03/2024    RDW 12.8 01/03/2024    .0 01/03/2024        Lab Results   Component Value Date    GLU 89 01/03/2024    BUN 15 01/03/2024    BUNCREA 17.9 01/03/2024    CREATSERUM 0.84 01/03/2024    ANIONGAP 6 01/03/2024    CA 9.4 01/03/2024    OSMOCALC 286 01/03/2024     ALKPHO 54 01/03/2024    AST 18 01/03/2024    ALT 12 01/03/2024    BILT 0.9 01/03/2024    TP 7.6 01/03/2024    ALB 4.6 01/03/2024    GLOBULIN 3.0 01/03/2024     01/03/2024    K 4.5 01/03/2024     01/03/2024    CO2 28.0 01/03/2024        No results found.          .  ASSESSMENT/PLAN:   Whit Rios is a 33 year old year-old female with history of hypercholesterolemia, anxiety, GERD, family history of colon cancer who presents for crc screening.     #crc screening  #family hx of colon cancer  Patient last cln in 2019 advised for 5 year recall due to both parents having colon cancer at young age. No change in bowel habits. No brbpr or melena. No abdominal pain, weight loss. Patient has not undergone genetic testing, but is open to it at this time. Discussed planned cln and genetic counselor referral.     Patient agreeable to plan, All questions answered.     1. Schedule colonoscopy with Dr. Ruelas or Dr. Rocha mac or IV  [Diagnosis: crc screening, family history of screening]    2.  bowel prep from pharmacy (split suprep)    3. Hold Vyvanse day before and day of procedure.     4. Read all bowel prep instructions carefully. Bowel prep instructions can also be found online at:  [email or web address].[email or web address]     5. AVOID seeds, nuts, popcorn, raw fruits and vegetables for 3 days before procedure    6. You MAY need to go for COVID testing 72 hours before procedure. The testing team will call you a few days before your procedure to discuss with you if testing is required. If you are asked to go for COVID testing and do not completed the test, the procedure cannot be performed.     7. If you start any NEW medication after your visit today, please notify us. Certain medications (like iron or weight loss medications) will need to be held before the procedure, or the procedure cannot be performed safely.      EGD consent: I have discussed the risks, benefits, and alternatives to upper endoscopy/enteroscopy with the  patient/primary decision maker [who demonstrated understanding], including but not limited to the risks of bleeding, infection, pain, death, as well as the risks of anesthesia and perforation all leading to prolonged hospitalization, surgical intervention, or even death. I also specifically mentioned the miss rate of upper endoscopy of 5-10% in the best of all circumstances.  The patient has agreed to sign an informed consent and elected to proceed with procedure with possible intervention [i.e. polypectomy, stent placement, etc.] as indicated.        Orders This Visit:  No orders of the defined types were placed in this encounter.      Meds This Visit:  Requested Prescriptions     Signed Prescriptions Disp Refills    Na Sulfate-K Sulfate-Mg Sulf (SUPREP BOWEL PREP KIT) 17.5-3.13-1.6 GM/177ML Oral Solution 1 each 0     Sig: Take prep as directed by gastro office. May substitute with Trilyte/generic equivalent if needed.       Imaging & Referrals:  OP REFERRAL TO GENETIC COUNSELOR      Zoraida Latham PA-C   3/4/2024        This note was partially prepared using Dragon Medical voice recognition dictation software. As a result, errors may occur. When identified, these errors have been corrected. While every attempt is made to correct errors during dictation, discrepancies may still exist.

## 2024-03-18 NOTE — TELEPHONE ENCOUNTER
A refill request was received for:  Requested Prescriptions     Pending Prescriptions Disp Refills    lisdexamfetamine (VYVANSE) 30 MG Oral Cap 30 capsule 0     Sig: Take 1 capsule (30 mg total) by mouth every morning. Start medication only after you are no longer breast-feeding.     Last refill date:  2/14/24  Qty: 30 and 0   Dx: ADHD   Last office visit: 1/3/24   When is follow up due: 4/2/24    Future Appointments   Date Time Provider Department Center   3/21/2024  9:00 AM Dionne Rollnis [facility]   3/21/2024  9:15 AM [facility] [place]   4/10/2024  3:30 PM Alessia Lee MD [facility]   9/20/2024 11:15 AM RENNY CASAS [id number] None

## 2024-03-21 NOTE — PROGRESS NOTES
Patient Name: Whit Rios  YOB: 1990  Date of Visit: 3/21/2024    Reason for visit: Ms. Rios was seen for the purposes of genetic counseling due to a family history of colorectal and other cancers and a family history of VLCAD carrier status in her son detected on his NBS.    Referring Provider: Alphonse Sinclair MD; Zoraida Latham PA-C    Medical History: Ms. Rios is a pleasant and generally healthy  33 year old female presenting with no personal history of cancer. She retains her uterus, ovaries, and fallopian tubes.    She has had a colonoscopy last in 2019 which was normal per patient, with recall in 5y, she is scheduled for her f/u c-scope on 24 with Dr. Ruelas's office.     Ms. Rios achieved menarche at approximately 15 years of age, is pre-menopausal, and has been pregnant 3 times, delivering her first of 3 children at 30. Ms. Rios has a 5-10-year history of oral contraceptive use and denies any fertility or hormone replacement use.      Relevant Family History: Ms. Rios has 2 daughters (3y, 1y) who are healthy and 1 son (<1y) who was flagged for possible VLCAD on his [place], follow-up testing including molecular genetic testing showed he was an unaffected carrier of VLCAD. Neither Ms. Rios or her  have had carrier screening.     Ms. Rios has 2 sisters (30s) and 1 brother (39y) who was found to have a melanoma in situ in his 30s with a h/o significant sun exposure. The brother has 1 daughter who was born with a congenital heart defect s/p surgical repair.    Her mother  at 54y dt metastatic colorectal cancer initially dx at ~52y, it was stage III at dx. There is 1 maternal aunt and 1 maternal uncle (both >50y) with no cancer hx in them or their children. The maternal uncle had 1 son who  dt complications from a congenital cardiac defect. The maternal grandparents both  in their 80s with no cancer hx. The maternal grandmother's father reportedly had a h/o colon  cancer.    Ms. Rios's father (67y) has a h/o colorectal cancer dx at 55y, he also has a h/o melanoma in situ, and has a h/o diabetes resulting in renal fx necessitating a kidney transplant (kidney donor was Ms. Rios's youngest sister). Her father was an only child. The paternal grandmother  in her mid-70s dt diabetes complications with renal failure. The paternal grandmother's niece was reportedly diagnosed with uterine cancer. The paternal grandfather  in his late-70s with no cancer hx.     The rest of the family history is negative for other significant genetic conditions, cancers, or birth defects of any kind. See scanned pedigree for full family history reported during the session.    Ms. Torress maternal ethnicity is Cambodian/Polish and her paternal ethnicity is Liberian/[place] Islander/Slovakian. She is unaware of any Ashkenazi Zoroastrian heritage.     Summary: We reviewed that VLCAD is inherited in an autosomal recessive fashion. In order for an individual to be affected with an autosomal recessive disorder, they must have two disease causing genetic changes, one in each copy of the causative gene. Carriers of the disorder, who have only one disease-causing genetic change, typically do not have symptoms. When both reproductive partners are carriers of an autosomal recessive disorder, there is a 25% chance for each child to have the disorder. Because Ms. Rios's son was found to be a carrier of VLCAD on NBS and follow-up molecular genetic testing, carrier screening for Ms. Rios and possibly her  for VLCAD and other conditions is recommended to determine whether the couple has an elevated risk to have a child with VLCAD and/or other recessive/X-linked conditions. The couple is interested in possibly having more children. Carrier screening for VLCAD in the setting of a broader carrier screening panel is indicated to assess the couple's reproductive risk. Ms. Rios understands that should she be a carrier  of a tested condition, follow-up testing for her  would then be recommended.     Hereditary cancer  The majority of cancers are sporadic whereas approximately 10-30% of cases of cancer cases are attributed to familial factors such as unidentified low penetrance genes in the family or shared environmental factors.  Approximately 5-10% of cancers are related to a hereditary cancer syndrome.  Signs of a hereditary cancer syndrome include some rare cancers, common cancers occurring at unusually young ages, multiple primary cancers in the same individual, multiple colorectal polyps, or the same type of cancer or related cancers (e.g., breast and ovarian, colorectal and endometrial) in three or more individuals in the same lineage.     Cancer is usually caused by gene mutations that occur randomly in one or a few cells of the body. Such gene changes, called somatic mutations, may arise as a natural consequence of aging or when a cell’s DNA has been damaged. Acquired mutations are only present in some of the body’s cells, and they are not passed on from parents to their children.    However, in the small percentage of people with a hereditary cancer syndrome, the disease is due to a different type of mutation called a hereditary mutation, or germline mutation. These mutations are usually inherited from one or both of the person’s parents, and are present in nearly every cell of the body. Because hereditary mutations are present in the DNA of sperm and egg cells, they can be passed down in families. People who carry such hereditary mutations do not necessarily get cancer, but their risk of developing the disease at some point during their lifetime is higher than average. When a personal and/or family history doesn't clearly fit a single hereditary cancer syndrome, panel genetic testing examining multiple genes in which pathogenic mutations cause hereditary cancer syndromes is appropriate.    If testing is performed,  three results are possible: positive, negative, and variant of uncertain significance.  A positive result indicates a pathogenic variant (harmful gene mutation) has been identified, and there is an increased risk for the cancers associated with the specific gene.  Since pathogenic variants in most cancer susceptibility genes are inherited in an autosomal dominant fashion, siblings and children of individuals with a pathogenic variant have a 50% risk of carrying the same familial pathogenic variant as well.      A negative test result would indicate that no pathogenic variant was identified in a cancer susceptibility gene.  While testing detects gene mutations, it is possible for a genetic variant to be present and go undetected.  It is also possible for a genetic variant to be located in a gene other than those being tested.     A variant of uncertain significance means that a change has been identified a cancer susceptibility gene; however, it is uncertain if the variant is pathogenic or a non-deleterious (benign) change.  With time, the variant may be reclassified as either pathogenic or benign.    Since pathogenic variant identification is necessary, an affected family member is preferred in order to confirm that a pathogenic variant is indeed present in a particular family.  If the pathogenic variant can be identified in an affected individual, this information can be used to screen other at-risk individuals in the family.  Individuals who are positive for the same pathogenic variant would be expected to have a much greater risk for developing hereditary cancer during their lifetime than the general population and surveillance and management would be rigorous.  For those individuals who are found to not carry the pathogenic variant, risks for developing cancer during their lifetime would return to those expected for individuals in the general population.  It should be emphasized that absence of a pathogenic  variant in an at-risk individual would not eliminate their risk for cancer, but simply return them to the risk expected for the general population. If no affected individual is available for testing, a negative result, while reassuring, cannot be completely informative of the familial cancer risk as it is unknown whether no pathogenic variant was found because the individual tested is truly negative for the familial pathogenic variant or whether the familial pathogenic variant was unable to be detected by the genetic testing method used. In such cases, screening and follow-up should be guided by personal and family history.     It should be noted that no one under age 18 can have testing performed for mutations in high-penetrance cancer predisposition genes for adult-onset conditions. Ms. Torress genetic information is protected under the Genetic Information Nondiscrimination Act of 2008 (DREW). DREW is a federal law protecting individuals from genetic discrimination in health insurance and most places of employment. DREW protections against genetic discrimination do not apply to other forms of insurance such as life, long term care, disability, and  insurance. Individuals without such policies in place may wish to consider doing so before proceeding with genetic testing, since their genetic information could potentially be used to inform decisions concerning eligibility, premiums, and coverage.    The PREMM5 model is a clinical prediction algorithm that estimates the cumulative probability of an individual carrying a germline mutation in the MLH1, MSH2, MSH6, PMS2, or EPCAM genes. Mutations in these genes cause Ruelas syndrome, an inherited cancer predisposition syndrome. Using the PREMM5 model, Ms. Rios was estimated to have a 1.9% probability to have a germline mutation in one of the five Ruelas Syndrome genes. If the overall predicted probability is 2.5% or greater, referral for genetic evaluation is  recommended.     Based on the personal and family history information presented in the session, Ms. Rios currently does not meet criteria for genetic testing of high-penetrance cancer susceptibility genes based on the NCCN guidelines. Because of this reason, Ms. Rios's insurance likely will not cover genetic testing. Ms. Rios was advised that there is a $250 self-pay option for testing through the genetic laboratory which is eligible for FSA/HSA reimbursement. Ms. Rios elected to move forward with self-pay.       Ms. Rios appeared to understand the information presented. On the day of the visit Ms. Rios elected to proceed with carrier screening panel including VLCAD and genetic testing for hereditary colorectal and other cancer syndrome. TAT is ~2-3 weeks. My office will call Ms. Rios as soon as results are received; post-test counseling can be scheduled at that time. Thank you for allowing me to participate in the care of your patient; please do not hesitate to contact my office if you have any questions or concerns, [phone].    Plan:   Blood was drawn and sent out for: 1) [employer] universal carrier screening panel (176 conditions including VLCAD; TAT: 2-3 weeks); 2) [facility] Ruelas syndrome panel in the setting of the multi-cancer + RNA panel (70 genes, TAT: 2-3 weeks).    The Genetics office will call Ms. Rios when results are available.  Recommendations for Ms. Rios and family members will depend the above genetic testing results.      Send to: Noa/Suraj/Yahir/Ryan  Time spent with patient: 45 minutes      Dionne Rollins MS, formerly [facility]

## 2024-04-02 NOTE — PROGRESS NOTES
Patient Name: Whit Rios  YOB: 1990    Referring Provider:  Alphonse Sinclair MD; Zoraida Latham PA-C      Reason for Referral:  Ms. Rios had genetic testing performed on 3/21/2024 because of a family history of colorectal and other cancers.      Genetic Testing Result:  Negative. No pathogenic variant was found in the following 70 genes on the [facility] Ruelas syndrome panel and multi-cancer + RNA panel: AIP, ALK, APC, ANTONIO, AXIN2, BAP1, BARD1, BLM, BMPR1A, BRCA1, BRCA2, BRIP1, CDC73, CDH1, CDK4, CDKN1B, CDKN2A, CHEK2, CTNNA1, DICER1, EGFR, EPCAM, FH, FLCN, GREM1, HOXB13, KIT, LZTR1, MAX, MBD4, MEN1, MET, MITF, MLH1, MSH2, MSH3, MSH6, MUTYH, NF1, NF2, NTHL1, PALB2, PDGFRA, PMS2, POLD1, POLE, POT1, IZODG9T, PTCH1, PTEN, RAD51C, RAD51D, RB1, RET, SDHA, SDHAF2, SDHB, SDHC, SDHD, SMAD4, SMARCA4, SMARCB1, SMARCE1, STK11, SUFU, GKIS057, TP53, TSC1, TSC2, VHL. Please refer to the report from [email or web address] for additional testing information. These results were discussed with Ms. Rios via telephone on 4/2/2024.     Summary and Plan:   These results indicate that Ms. Rios was not found to have a pathogenic variant (harmful genetic mutation) in any of the genes listed above. No pathogenic variants associated with hereditary colorectal cancer syndrome or other hereditary cancer syndromes were identified. The etiology Ms. Torress family history of cancer remains genetically unexplained. The limitations of the testing were discussed with Ms. Rios including the chance that a pathogenic variant in a gene other than those included in this analysis might be the cause of cancer in Ms. Rios or in relatives.     Medical management and surveillance for Ms. Rios and other family members should be based on their personal and family history. All medical management decisions should be made with a physician. Given the family history of colon cancer in Ms. Rios's mother and father, the etiology of which is genetically unexplained, Ms.  Gabriel and close family members, such as her siblings, should be advised to begin screening for colon cancer via colonoscopy starting at least by age 40y with repeat screening every 5 years or per findings (NCCN Colorectal cancer screening V.1.2024).      I encouraged Ms. Rios to share the genetic test results with her relatives so that they may discuss the implications of this information with their health providers. Ms. Rios is also encouraged to contact me on an annual basis to learn if there have been any updates in genetic information that would apply or if there are changes in the personal and/or family history. Please do not hesitate to contact my office if you have any questions or concerns, [phone].     Dionne Rollins MS, CGC

## 2024-04-16 NOTE — PROGRESS NOTES
Patient Name: Whit Rios  YOB: 1990    Referring Provider:  Alphonse Sinclair MD     Reason for Referral:  Ms. Rios had reproductive carrier screening performed on 3/21/2024 due to a reported family history of VLCAD carrier status in her son detected on his NBS.      Carrier Screening Result: Negative, low reproductive risk     Test performed: [employer] [employer] Carrier screen (176 conditions); universal, fundamental, and fundamental plus panels, including Fragile X syndrome.     Result: Negative. Ms. Rios was not found to have any disease-causing mutations. Post-test risk to be a carrier of any of the tested genetic conditions is low. Specifically, her post-test risk to be a VLCAD carrier is reduced to 1 in 18,000. Please refer to the scanned report from [employer] in the media tab for additional testing information.     These results were discussed with Ms. Rios via telephone on 4/16/2024.     Summary:  Ms. Rios and her partner are expected to have a low reproductive risk of having a child with any of the 176 disorders tested, including VLCAD (via ACADVL gene). There is a low reproductive risk, <0.001% chance, for the couple to have a child with VLCAD. Negative carrier screening results do not guarantee the birth of a healthy child.     No additional carrier screening is indicated for Ms. Rios's partner at this time.        I encouraged Ms. Rios to share the genetic test results with her partner and other family members so that they may discuss the implications of this information with their health providers if indicated. Please do not hesitate to contact my office if you have any questions or concerns, [phone].       Dionne Rollins MS, [facility]

## 2024-04-18 NOTE — TELEPHONE ENCOUNTER
A refill request was received for:  Requested Prescriptions     Pending Prescriptions Disp Refills    lisdexamfetamine (VYVANSE) 30 MG Oral Cap 30 capsule 0     Sig: Take 1 capsule (30 mg total) by mouth every morning.     Last refill date:  03/19/2024  Qty: 30  Dx:   Anxiety   Last office visit: 01/03/2024   When is follow up due: 3 months      For hormone prescriptions, date of last blood test for rx being requested:    Future Appointments   Date Time Provider Department Center   9/20/2024 11:15 AM [facility], PROC[facility]URE [id number] None

## 2024-04-28 NOTE — PATIENT INSTRUCTIONS
Sun Protection  Wear protective clothing. This includes wearing a broad rimmed hat, long sleeves, high collars, and tightly woven clothes. Specific clothing lines are available for people who need more complete sun avoidance. Use a broad spectrum sunblock (SPF 30 or higher when above options are not possible, or for areas not covered by clothing.    -Avoid being outside when the ultraviolet B (burning) wavelengths from the sun are most intense (10 AM to 3 PM) during non-winter seasons. [place] year round.    -Ultraviolet A wavelengths from the sun are present year round from jorge to dusk. They pass through window glass. This type of ultraviolet is the form seen in tanning beds. It will accentuate sunburns and will produce most of the sunlight -induced aging changes in our skin because it penetrates much deeper into the skin than do the ultraviolet B wavelengths. It is also associated with an increased chance of developing melanoma, potentially the most serious type of skin cancer.    -Broad spectrum sunblocks:  Mexoryl is a sunblock ingredient that is superior to other UVA protection agents. It is found in [employer] (available at [employer] or [employer]). Sunblocks containing microfine zinc oxide (Z-casey) or avobenzone (Parsol 1789) provide good UVA protection too.  Any sunscreen with the American Academy of Dermat[employer] Seal is a recommended sunscreen.     -Use SPF 30 or greater an apply liberally. It takes 2 tablespoons or one shot glass to cover the average man in a swim suit. The SPF number provides information about the relative amount of protection provided by a product. Because most people use sunblock sparingly, they may get only 1/2 to 1/3 the SPF coverage indicated on the label. SPF may be sufficient in theory, but in real life it is best to use a higher sunblock, hats, SPF clothing and swimwear, and shade.    -Application technique: Apply sunscreens and sunblocks 20-30 minutes before start off sun  exposure and reapply 20 minutes after exposure begins. Studies suggest this provides improved protection. Reapply sunscreens and sunblocks after 2 hours of use or after 80 minutes if swimming or sweating. Sunscreen effectiveness diminishes after 2 hours unless properly reapplied.     Checking for Skin Cancer  You can find cancer early by checking your skin each month. There are 3 common kinds of skin cancer. They are melanoma, basal cell carcinoma, and squamous cell carcinoma. Doing monthly skin checks is the best way to find new marks or skin changes. Follow the instructions below for checking your skin.  The ABCDEs of checking moles for melanoma  Check your moles or growths for signs of melanoma using ABCDE:  Asymmetry: the sides of the mole or growth don’t match  Border: the edges are ragged, notched, or blurred  Color: the color within the mole or growth varies  Diameter: the mole or growth is larger than 6 mm (size of a pencil eraser)  Evolving: the size, shape, or color of the mole or growth is changing    Checking for other types of skin cancer  Basal cell carcinoma or squamous cell carcinoma have symptoms such as:  A spot or mole that looks different from all other marks on your skin  Changes in how an area feels, such as itching, tenderness, or pain  Changes in the skin's surface, such as oozing, bleeding, or scaliness  A sore that does not heal  New swelling or redness beyond the border of a mole  Who’s at risk?  Anyone can get skin cancer. But you are at greater risk if you have:  Fair skin, light-colored hair, or light-colored eyes  Many moles or abnormal moles on your skin  A history of sunburns from sunlight or tanning beds  A family history of skin cancer  A history of exposure to radiation or chemicals  A weakened immune system  If you have had skin cancer in the past, you are at risk for recurring skin cancer.  How to check your skin  Do your monthly skin checkups in front of a full-length mirror.  Check all parts of your body, including your:  Head (ears, face, neck, and scalp)  Torso (front, back, and sides)  Arms (tops, undersides, upper, and lower armpits)  Hands (palms, backs, and fingers, including under the nails)  Buttocks and genitals  Legs (front, back, and sides)  Feet (tops, soles, toes, including under the nails, and between toes)  If you have a lot of moles, take digital photos of them each month. Make sure to take photos both up close and from a distance. These can help you see if any moles change over time.  Most skin changes are not cancer. But if you see any changes in your skin, call your doctor right away. Only he or she can diagnose a problem. If you have skin cancer, seeing your doctor can be the first step toward getting the treatment that could save your life.  [employer] last reviewed this educational content on 4/1/2019 © 2000-2021 [employer]. All rights reserved. This information is not intended as a substitute for professional medical care. Always follow your healthcare professional's instructions.    Preventing Skin Cancer     Use sunscreen of SPF 30 or greater. Apply liberally.   Relaxing in the sun may feel good. But it isn’t good for your skin. In fact, the sun’s harmful rays are the major cause of skin cancer. This is a serious disease that can be life-threatening. People of all ages, races, and backgrounds are at risk.  Skin cancer is the most common cancer in the [place] But in most cases, it can be prevented.  Your role in prevention  You can act today to help prevent skin cancer. Start by avoiding the sun’s UV (ultraviolet) rays. And don’t use tanning beds or lamps. They are no safer than the sun. Taking these steps can help keep you from getting skin cancer. It can also help prevent wrinkles and other aging effects caused by the sun. Make sure your children also follow these safeguards. Now is the time to start taking steps to prevent skin cancer.  When you are  outdoors  Protect your skin when you go out during the day. Take safety steps whenever you go out to eat, run errands by car or on foot, or do any outdoor activity. There isn’t just one easy way to protect your skin. It’s best to follow all of these steps:  Wear tightly woven clothing that covers your skin. Put on a wide-brimmed hat to protect your face, ears, and scalp.  Watch the clock. Try to stay out of the sun between 10 a.m. and 4 p.m. That's when the sun's rays are strongest.  Head for the shade or create your own. Use an umbrella when sitting or strolling.  Know that the sun’s rays can reflect off sand, water, and snow. This can harm your skin. Take extra care when you are near reflective surfaces.  Keep in mind that even when the weather is hazy or cloudy, your skin can be exposed to strong UV rays.  Shield your skin with sunscreen. Also use sunscreen on your children’s skin. Keep babies younger than 6 months old out of the sun.  Tips for using sunscreen  To help prevent skin cancer, choose the right sunscreen and use it correctly. Try these tips:  Choose a sunscreen that has an SPF (sun protection factor) of at least 30. Also choose a sunscreen labeled \"broad spectrum.” This will protect you from both UVA and UVB (ultraviolet A and B) rays.  If one brand irritates your skin, try another, such as one without fragrance.  Use a water-resistant sunscreen if you swim or sweat.  Use at least 1 ounce of sunscreen to cover exposed areas. This is enough to fill a shot glass. You might need to adjust the amount depending on your body size.  Put the sunscreen on dry skin about 15 minutes before going outdoors. This gives it time to soak in.  Reapply sunscreen every 2 hours. If you’re active, do this more often.  Cover any sun-exposed skin, from your face to your feet. Don’t forget your scalp, ears, and lips.  Know that while sunscreen helps protect you, it isn’t enough. Sunscreens extend the length of time you can be  outdoors before your skin starts to get red. But they don't give you total protection. Using sunscreen doesn't mean you can stay out in the sun for an unlimited time. Your skin cells are still being damaged. You should also wear protective clothing. And try to stay out of the sun as much as you can, especially from 10 a.m. to 4 p.m.  [employer] last reviewed this educational content on 7/1/2019 © 2000-2021 [employer]. All rights reserved. This information is not intended as a substitute for professional medical care. Always follow your healthcare professional's instructions.    Common Types of Skin Cancer  Skin cancer is a serious disease that can affect anyone at any age. It's the most common kind of cancer in the [place] If found early, when it's small and hasn't spread, skin cancer can often be treated with success. But in some cases, it's life-threatening.  Talk with your healthcare provider about what you can do to help prevent skin cancer. Ask about regular skin exams as part of your routine physicals. You can also ask about doing monthly self-exams of your skin. If you see any changes in your skin, call your healthcare provider right away.   Understanding the skin  The skin is made up of 3 layers: epidermis, dermis, and hypodermis or fat layer. The epidermis is the thin outer layer of the skin. It consists of 3 types of cells: squamous cells, basal cells, and melanocytes. The squamous cells are flat cells in the outermost layer and are continuously shed. The basal cells are round cells found just beneath the squamous cells at the base of the epidermis. The melanocytes are found in every layer of the epidermis and make melanin. This give the skin its color. The dermis layer is the middle layer of skin and consists of blood and lymph vessels, hair follicles, oil and sweat glands, nerves and collagen. This layer give skin flexibility and strength. The fat layer is the deepest layer consisting of fat  and collagen. It helps provide the body's heat and protects the body from injury.  Skin cancer is a type of cancer that grows in the epidermal layer of the skin. It can form in the basal cells, squamous cells, or melanocytes. Skin cancer can be grouped into 2 types: non-melanoma and melanoma. The 2 most common types of non-melanoma skin cancer are basal cell carcinoma and squamous cell carcinoma.  Basal cell cancer  Basal cell cancer is the most common skin cancer. It starts in basal cells in the deepest part of the epidermis. It's usually found on the face, ears, neck, trunk, or arms, but it can start anywhere. Varying in color, these lesions may be waxy, pearly, scaly, or scar-like. Tiny blood vessels can sometimes be seen through the lesion’s surface. These lesions can bleed easily and might not heal well. Nearly all basal cell cancers can be treated and cured.  Squamous cell cancer  Squamous cell cancer is another type of skin cancer. It starts in flat cells called squamous cells in the upper part of the epidermis. Lesions often form on the face, ears, neck, hands, or arms. They can start in scars and sores that don't heal. The lesions are firm, red bumps or flat, scaly, crusty growths. Squamous cell carcinoma is more likely to grow and spread to other parts of the body than basal cell carcinoma, although this is still uncommon. Most squamous cell carcinoma is found early enough to be treated and cured.  Melanoma  Melanoma is a less common, but much more dangerous kind of skin cancer. It starts in skin cells called melanocytes. It's more likely to grow and spread than basal or squamous cell cancers. It's often not easy to tell where a melanoma lesion’s borders are. It's often brown or black, but it may be mixed in color. The shape and size of melanoma lesions tend to differ from one side to the other. Melanoma can start anywhere, like the genitals, mouth, palms of hands, bottoms of feet, and under the  nails.  There are other types of skin cancer, too. These include Merkel cell cancer, Kaposi sarcoma, and skin (cutaneous) lymphomas, but these cancers are quite rare.  Actinic keratosis  Actinic keratosis is not skin cancer. It's a common, pre-cancer skin change that can turn into a squamous cell skin cancer over time if left untreated. Actinic keratosis lesions tend to appear on sun-exposed parts of the body. They can be pink, reddish-brown, or skin-colored. These lesions are most often small, raised, scaly, and rough, like sandpaper. In some cases, actinic keratosis lesions hurt. Most people with them have more than one lesion. Getting early treatment for actinic keratoses almost always cures the lesions.  [employer] last reviewed this educational content on 6/1/2019  © 0390-0050 [employer]. All rights reserved. This information is not intended as a substitute for professional medical care. Always follow your healthcare professional's instructions.

## 2024-04-28 NOTE — PROGRESS NOTES
Whit Rios is a 33 year old female.  HPI:     CC:    Chief Complaint   Patient presents with    Full Skin Exam     Brother and father hx of melanoma. NEW pt presenting for full body exam.  Has done gentic testing, which was negative both family members dx has been recent. Lots of sun exposure as a child, life guarding, etc.  Lesion of concern to the left collarbone and right temple, denies bleeding or pain.            HISTORY:    Past Medical History:    Anemia    PT TOOK IRON PILLS; PRIOR TO PREGNANCY- RESOLVED    Anxiety state    Depression    Hypercholesterolemia    Nausea & vomiting      Past Surgical History:   Procedure Laterality Date    Colonoscopy N/A 2019    Procedure: COLONOSCOPY;  Surgeon: Florina Barrett MD;  Location: [facility] ENDOSCOPY    Colonoscopy        Family History   Problem Relation Age of Onset    Colon Cancer Mother 52         at 54    Kidney Disease Father         dialysis    Colon Cancer Father 55    Diabetes Father     Renal Disease Father     Melanoma Father         in situ    No Known Problems Sister     No Known Problems Sister     Melanoma Brother         in situ    Uterine Cancer Other     Colon Cancer Maternal Great-Grandfather       Social History     Socioeconomic History    Marital status:    Occupational History    Occupation: insurance   Tobacco Use    Smoking status: Never    Smokeless tobacco: Never   Vaping Use    Vaping status: Never Used   Substance and Sexual Activity    Alcohol use: Not Currently    Drug use: Never    Sexual activity: Yes     Partners: Male     Birth control/protection: Pill   Other Topics Concern    Exercise Yes    Grew up on a farm No    History of tanning Yes    Outdoor occupation No    Breast feeding No    Reaction to local anesthetic No    Pt has a pacemaker No    Pt has a defibrillator No   Social History Narrative    ** Merged History Encounter **          Social Determinants of Health     Financial Resource Strain: Low Risk   (10/5/2023)    Financial Resource Strain     Difficulty of Paying Living Expenses: Not hard at all     Med Affordability: No   Food Insecurity: No Food Insecurity (10/5/2023)    Food Insecurity     Food Insecurity: Never true   Transportation Needs: No Transportation Needs (10/5/2023)    Transportation Needs     Lack of Transportation: No   Stress: No Stress Concern Present (10/5/2023)    Stress     Feeling of Stress : No   Housing Stability: Low Risk  (10/5/2023)    Housing Stability     Housing Instability: No        Current Outpatient Medications   Medication Sig Dispense Refill    lisdexamfetamine (VYVANSE) 30 MG Oral Cap Take 1 capsule (30 mg total) by mouth every morning. 30 capsule 0    Na Sulfate-K Sulfate-Mg Sulf (SUPREP BOWEL PREP KIT) 17.5-3.13-1.6 GM/177ML Oral Solution Take prep as directed by gastro office. May substitute with Trilyte/generic equivalent if needed. (Patient not taking: Reported on 4/10/2024) 1 each 0     Allergies:   No Known Allergies    Past Medical History:    Anemia    PT TOOK IRON PILLS; PRIOR TO PREGNANCY- RESOLVED    Anxiety state    Depression    Hypercholesterolemia    Nausea & vomiting     Past Surgical History:   Procedure Laterality Date    Colonoscopy N/A 09/06/2019    Procedure: COLONOSCOPY;  Surgeon: Florina Barrett MD;  Location: [facility] ENDOSCOPY    Colonoscopy       Social History     Socioeconomic History    Marital status:      Spouse name: Not on file    Number of children: Not on file    Years of education: Not on file    Highest education level: Not on file   Occupational History    Occupation: insurance   Tobacco Use    Smoking status: Never    Smokeless tobacco: Never   Vaping Use    Vaping status: Never Used   Substance and Sexual Activity    Alcohol use: Not Currently    Drug use: Never    Sexual activity: Yes     Partners: Male     Birth control/protection: Pill   Other Topics Concern     Service Not Asked    Blood Transfusions Not Asked    Caffeine  Concern Not Asked    Occupational Exposure Not Asked    Hobby Hazards Not Asked    Sleep Concern Not Asked    Stress Concern Not Asked    Weight Concern Not Asked    Special Diet Not Asked    Back Care Not Asked    Exercise Yes    Bike Helmet Not Asked    Seat Belt Not Asked    Self-Exams Not Asked    Grew up on a farm No    History of tanning Yes    Outdoor occupation No    Breast feeding No    Reaction to local anesthetic No    Pt has a pacemaker No    Pt has a defibrillator No   Social History Narrative    ** Merged History Encounter **          Social Determinants of Health     Financial Resource Strain: Low Risk  (10/5/2023)    Financial Resource Strain     Difficulty of Paying Living Expenses: Not hard at all     Med Affordability: No   Food Insecurity: No Food Insecurity (10/5/2023)    Food Insecurity     Food Insecurity: Never true   Transportation Needs: No Transportation Needs (10/5/2023)    Transportation Needs     Lack of Transportation: No   Stress: No Stress Concern Present (10/5/2023)    Stress     Feeling of Stress : No   Housing Stability: Low Risk  (10/5/2023)    Housing Stability     Housing Instability: No     Housing Instability Emergency: Not on file     Family History   Problem Relation Age of Onset    Colon Cancer Mother 52         at 54    Kidney Disease Father         dialysis    Colon Cancer Father 55    Diabetes Father     Renal Disease Father     Melanoma Father         in situ    No Known Problems Sister     No Known Problems Sister     Melanoma Brother         in situ    Uterine Cancer Other     Colon Cancer Maternal Great-Grandfather        There were no vitals filed for this visit.    HPI:  Chief Complaint   Patient presents with    Full Skin Exam     Brother and father hx of melanoma. NEW pt presenting for full body exam.  Has done gentic testing, which was negative both family members dx has been recent. Lots of sun exposure as a child, life guarding, etc.  Lesion of concern to  the left collarbone and right temple, denies bleeding or pain.      Family history melanoma father father, patient with send exposure  and youth.  Currently careful sun protection no suspicious lesions removed previously  Patient presents with concerns above.    Patient has been in their usual state of health.     Past notes/ records and appropriate/relevant lab results including pathology and past body maps reviewed. Including outside notes/ PCP notes as appropriate. Updated and new information noted in current visit.     ROS:  Denies other relevant systemic complaints.      History, medications, allergies reviewed as noted.    Allergies:  Patient has no known allergies.     Physical Examination:     Well-developed well-nourished patient alert oriented in no acute distress.  Exam performed, including scalp, head, neck, face,nails, hair, external eyes, including conjunctival mucosa, eyelids, lips external ears , arms, digits,palms.     Multiple light to medium brown, well marginated, uniformly pigmented, macules and papules 6 mm and less scattered on exam. pigmented lesions examined with dermoscopy benign-appearing patterns.     Waxy tannish keratotic papules scattered, cherry-red vascular papules scattered.    See map today's date for lesions noted .  See assessment and plan below for specific lesions.    Otherwise remarkable for lesions as noted on map.    See A/P  below for additional information:    Assessment / plan:    No orders of the defined types were placed in this encounter.      Meds & Refills for this Visit:  Requested Prescriptions      No prescriptions requested or ordered in this encounter         Encounter Diagnoses   Name Primary?    Multiple nevi Yes    SK (seborrheic keratosis)     Lentigo     Benign neoplasm of skin, unspecified location     Ephelides     Skin cancer screening     Sun exposure, moderate, sequela     Family history of melanoma      Patient with extensive ephelides and  tissues of the shoulders upper back and hands numerous small junctional nevi benign-appearing.    Monitor lesions 1 cm nevus right breast, papule 1 cm more pedunculated at right upper abdomen macule light brown right anterior ankle, papule 1 x 1.2 cm at left medial shoulder clavicular area all benign patterns on dermoscopy.    Risk of melanoma given family history of sun exposure, nonmelanoma skin cancers reviewed continue careful sun protection  Currently no atypical appearing lesions.    Follow-up annually    Patient high risk for melanoma and nonmelanoma skin cancers    Benign-appearing nevi, no atypical features on dermoscopy reassurance given monitor.     Waxy tan keratotic papules lesions in areas of concern as noted reassurance given.  Benign nature discussed.  Possibility of cryo, alphahydroxy acids over-the-counter retinol's discussed.     No other susupicious lesions on todays  exam.    Please refer to map for specific lesions.  See additional diagnoses.  Pros cons of various therapies, risks benefits discussed.Pathophysiology discussed with patient.  Therapeutic options reviewed.  See  Medications in grid.  Instructions reviewed at length.    Benign nevi, seborrheic  keratoses, cherry angiomas:  Reassurance regarding other benign skin lesions.Signs and symptoms of skin cancer, ABCDE's of melanoma discussed with patient. Sunscreen (broad-spectrum, ideally mineral-based-UVA/UVB -SPF 30 or higher) use encouraged, sun protection/sun protective clothing, self exams reviewed Followup as noted RTC ---routine checkup    6 mos -one year or p.r.n.    Encounter Times   Including precharting, reviewing chart, prior notes obtaining history, medical exam, notes on body map, plan, counseling, discussion of therapeutic options, patient education, orders more than half total time spent in face to face time with patient.  My total time spent caring for the patient on the day of the encounter: 45 minutes     The patient  indicates understanding of these issues and agrees to the plan.  The patient is asked to return as noted in follow-up/ above.    This note was generated using Dragon voice recognition software.  Please contact me regarding any confusion resulting from errors in recognition..  Note to patient and family: The 21st Century Cures Act makes medical notes like these available to patients. However, be advised this is a medical document. It is intended as [id number] communication and monitoring of a patient's care needs. It is written in medical language and may contain abbreviations or verbiage that are unfamiliar. It may appear blunt or direct. Medical documents are intended to carry relevant information, facts as evident and the clinical opinion of the practitioner.

## 2024-05-22 NOTE — PROGRESS NOTES
This is a telemedicine visit with live, interactive video and audio.     Patient understands and accepts financial responsibility for any deductible, co-insurance and/or co-pays associated with this service.    SUBJECTIVE    33-year-old female calling to follow-up in regards to Vyvanse Rx.  Was originally started by previous PCP due to weight gain and binge eating.  It was discontinued due to pregnancy however restarted around 2024 postdelivery.  Patient had stopped breast-feeding at that time.  Notes good improvement on the medication with weight down to 190-192 pounds.  Feels as if weight improvement has plateaued at this time.  Denies medication side effects such as abdominal discomfort, headache, irritability, anxiousness, sleep problems, chest pain, palpitations, tics, increases in heart rate or blood pressure.    HISTORY:  Past Medical History:    Anemia    PT TOOK IRON PILLS; PRIOR TO PREGNANCY- RESOLVED    Anxiety state    Depression    Hypercholesterolemia    Nausea & vomiting      Past Surgical History:   Procedure Laterality Date    Colonoscopy N/A 2019    Procedure: COLONOSCOPY;  Surgeon: Florina Barrett MD;  Location: [facility] ENDOSCOPY    Colonoscopy        Family History   Problem Relation Age of Onset    Colon Cancer Mother 52         at 54    Kidney Disease Father         dialysis    Colon Cancer Father 55    Diabetes Father     Renal Disease Father     Melanoma Father         in situ    No Known Problems Sister     No Known Problems Sister     Melanoma Brother         in situ    Uterine Cancer Other     Colon Cancer Maternal Great-Grandfather       Social History     Socioeconomic History    Marital status:    Occupational History    Occupation: insurance   Tobacco Use    Smoking status: Never    Smokeless tobacco: Never   Vaping Use    Vaping status: Never Used   Substance and Sexual Activity    Alcohol use: Not Currently    Drug use: Never    Sexual activity: Yes     Partners:  Male     Birth control/protection: Pill   Other Topics Concern    Exercise Yes    Grew up on a farm No    History of tanning Yes    Outdoor occupation No    Breast feeding No    Reaction to local anesthetic No    Pt has a pacemaker No    Pt has a defibrillator No   Social History Narrative    ** Merged History Encounter **          Social Determinants of Health     Financial Resource Strain: Low Risk  (10/5/2023)    Financial Resource Strain     Difficulty of Paying Living Expenses: Not hard at all     Med Affordability: No   Food Insecurity: No Food Insecurity (10/5/2023)    Food Insecurity     Food Insecurity: Never true   Transportation Needs: No Transportation Needs (10/5/2023)    Transportation Needs     Lack of Transportation: No   Stress: No Stress Concern Present (10/5/2023)    Stress     Feeling of Stress : No   Housing Stability: Low Risk  (10/5/2023)    Housing Stability     Housing Instability: No        No Known Allergies   Current Outpatient Medications   Medication Sig Dispense Refill    lisdexamfetamine (VYVANSE) 50 MG Oral Cap Take 1 capsule (50 mg total) by mouth daily. 30 capsule 0    [START ON 6/22/2024] lisdexamfetamine (VYVANSE) 50 MG Oral Cap Take 1 capsule (50 mg total) by mouth daily. 30 capsule 0    [START ON 7/23/2024] lisdexamfetamine (VYVANSE) 50 MG Oral Cap Take 1 capsule (50 mg total) by mouth daily. 30 capsule 0    Na Sulfate-K Sulfate-Mg Sulf (SUPREP BOWEL PREP KIT) 17.5-3.13-1.6 GM/177ML Oral Solution Take prep as directed by gastro office. May substitute with Trilyte/generic equivalent if needed. (Patient not taking: Reported on 4/10/2024) 1 each 0       OBJECTIVE  Physical Exam:   alert, appears stated age, cooperative, and no distress, Speaking in full sentences comfortably, and Normal work of breathing    ASSESSMENT & PLAN    1. Binge eating (Primary)  Diagnosed by previous PCP.    Notes good improvement on the medication with weight down to 190-192 pounds.  -Medication risks  discussed.   -     Lisdexamfetamine Dimesylate; Take 1 capsule (50 mg total) by mouth daily.  Dispense: 30 capsule; Refill: 0  -     Lisdexamfetamine Dimesylate; Take 1 capsule (50 mg total) by mouth daily.  Dispense: 30 capsule; Refill: 0  -     Lisdexamfetamine Dimesylate; Take 1 capsule (50 mg total) by mouth daily.  Dispense: 30 capsule; Refill: 0  2. High risk medication use  We have discussed the proper use, risks and benefits of patient's medication(s). The patient is aware of the risks of taking the above medication and indicates understanding of these risks and agrees to the plan.   -     Lisdexamfetamine Dimesylate; Take 1 capsule (50 mg total) by mouth daily.  Dispense: 30 capsule; Refill: 0  -     Lisdexamfetamine Dimesylate; Take 1 capsule (50 mg total) by mouth daily.  Dispense: 30 capsule; Refill: 0  -     Lisdexamfetamine Dimesylate; Take 1 capsule (50 mg total) by mouth daily.  Dispense: 30 capsule; Refill: 0  3. Overweight (BMI 25.0-29.9)  Notes good improvement on the medication with weight down to 190-192 pounds.  -     Lisdexamfetamine Dimesylate; Take 1 capsule (50 mg total) by mouth daily.  Dispense: 30 capsule; Refill: 0  -     Lisdexamfetamine Dimesylate; Take 1 capsule (50 mg total) by mouth daily.  Dispense: 30 capsule; Refill: 0  -     Lisdexamfetamine Dimesylate; Take 1 capsule (50 mg total) by mouth daily.  Dispense: 30 capsule; Refill: 0        Return in about 4 months (around 9/22/2024).  3 to 4 months.    Alphonse Sinclair MD

## 2024-08-07 NOTE — TELEPHONE ENCOUNTER
From: Whit Rios  To: Zoraida Latham  Sent: 8/7/2024 1:16 PM CDT  Subject: Colonoscopy Scheduled for September     Hello! I was seen in the winter/spring and had a colonoscopy scheduled for this fall. I am not almost 10 weeks pregnant. Would I need to cancel the procedure? I wanted to hold off to ask this question until I had my first ultrasound which I just had and everything seems good with mom and baby.     Thanks,   Whit    Thanks,

## 2024-08-08 NOTE — TELEPHONE ENCOUNTER
Patient viewed [employer] message   Last read by Whit Rios at 12:45 PM on 8/8/2024.

## 2024-08-08 NOTE — TELEPHONE ENCOUNTER
Left message to call back.  I also sent MyChart to inform patient.  I entered case change request to cancel 9/20/24 procedure and removed from appointment desk.

## 2024-08-08 NOTE — TELEPHONE ENCOUNTER
Patient will need to cancel. Can set up follow up once she has her baby.     Zoraida Latham PA-C

## 2024-09-20 NOTE — TELEPHONE ENCOUNTER
Received call from [facility]- they are requesting rx refill for Vyvanse 50 mg.     Patient is due for a follow up visit for refills- please schedule patient.         Thanks

## 2025-02-14 NOTE — TRIAGE
[facility]  part of [facility]      Triage Note    Whit Rios Patient Status:  Outpatient    6/15/1990 MRN [id number]   Location [facility] CENTER Attending Pilar Solorzano,    Hosp Day # 0 PCP Alphonse Sinclair MD      Para:   Estimated Date of Delivery: 3/9/25  Gestation: 36w5d    Chief Complaint    R/o Rom         Allergies:  Allergies[1]    Orders Placed This Encounter   Procedures    Antibody Identification (titer)    Rubella Antibodies, IgG    ABO GROUPING    RPR W/Conf    RPR W/Conf    Hepatitis B Surface Antigen    Rapid HIV    Rapid HIV    POCT Ferning    Chlamydia/Gc Amplification       Lab Results   Component Value Date    WBC 5.4 2024    HGB 14.3 2024    HCT 42.1 2024    .0 2024    CREATSERUM 0.84 2024    BUN 15 2024     2024    K 4.5 2024     2024    CO2 28.0 2024    GLU 89 2024    CA 9.4 2024    ALB 4.6 2024    ALKPHO 54 2024    BILT 0.9 2024    TP 7.6 2024    AST 18 2024    ALT 12 2024    T4F 1.0 2022    TSH 1.761 2024    B12 534 2021       Clinitek UA  Lab Results   Component Value Date    SPECGRAVITY 1.025 2022       UA  Lab Results   Component Value Date    SPECGRAVITY 1.025 2022    NITRITE Negative 2022       Vitals:    25 1428   BP: 109/64   Pulse: 78       NST  Variability: Moderate           Accelerations: Yes           Decelerations: None            Baseline: 130 BPM           Uterine Irritability: No           Contractions: Irregular                                        Acoustic Stimulator: No           Nonstress Test Interpretation: Reactive           Nonstress Test Second Interpretation: Reactive          FHR Category: Category I             Additional Comments       Chief Complaint   Patient presents with    R/o Rom     Pt felt leaking of fluid earlier todayat 1100;  unsure if her water broke.    Pt reports good fetal movement. Sterile speculum exam with no pooling of fluid, negative for ferning, and equivocal amnioswab. Cervical exam 1/50/-4. Findings reported to Dr Solorzano. Pt dc home with labor precautions and kick counts. Pt with verbalized understanding.       Dionne MATTHEWS RN  2/14/2025 3:09 PM         [1] No Known Allergies

## 2025-02-14 NOTE — PROGRESS NOTES
Pt is a 34 year old female admitted to TR3/TR3-A.     Chief Complaint   Patient presents with    R/o Rom     Pt felt leaking of fluid earlier today; unsure if her water broke.       Pt is  Unknown intra-uterine pregnancy.  History obtained, consents signed. Oriented to room, staff, and plan of care.

## 2025-03-05 NOTE — ANESTHESIA PROCEDURE NOTES
Labor Analgesia    Date/Time: 3/5/2025 3:00 PM    Performed by: Genaro Byers MD  Authorized by: Genaro Byers MD      General Information and Staff    Start Time:  3/5/2025 3:00 PM  End Time:  3/5/2025 3:01 PM  Anesthesiologist:  Genaro Byers MD  Performed by:  Anesthesiologist  Patient Location:  OB  Reason for Block: labor epidural    Preanesthetic Checklist: patient identified, IV checked, site marked, risks and benefits discussed, monitors and equipment checked, pre-op evaluation, timeout performed, anesthesia consent and sterile technique used      Procedure Details    Patient Position:  Sitting  Prep: ChloraPrep    Monitoring:  Heart rate  Approach:  Midline    Epidural Needle    Injection Technique:  FATEMEH saline  Needle Type:  Tuohy  Needle Gauge:  18 G  Needle Length:  3.5 in  Needle Insertion Depth:  6  Location:  L3-4    Spinal Needle      Catheter    Catheter Type:  Multi-orifice  Catheter Size:  20 G  Catheter at Skin Depth:  11  Test Dose:  Negative    Assessment      Additional Comments

## 2025-03-05 NOTE — PROGRESS NOTES
Pt is a 34 year old female admitted to LDR2/LDR2-A.     Chief Complaint   Patient presents with    Scheduled Induction     AMA        Pt is  39w3d intra-uterine pregnancy.  History obtained, consents signed. Oriented to room, staff, and plan of care.

## 2025-03-05 NOTE — ANESTHESIA PREPROCEDURE EVALUATION
Anesthesia PreOp Note    HPI:     Whit Rios is a 34 year old female who presents for preoperative consultation requested by: * No surgeons listed *    Date of Surgery: 3/5/2025    * No procedures listed *  Indication: * No pre-op diagnosis entered *    Relevant Problems   No relevant active problems       NPO:                         History Review:  Patient Active Problem List    Diagnosis Date Noted    Hypercholesterolemia 2024    Vaginal delivery ([facility]) 10/05/2023    Women's annual routine gynecological examination 2023    Pregnancy ([facility]) 2022     (normal spontaneous vaginal delivery) ([facility]) 2022     (normal spontaneous vaginal delivery) ([facility]) 2022    Encounter for supervision of normal pregnancy, antepartum ([facility]) 2022    Rubella non-immune status, antepartum ([facility]) 2021    Iron deficiency 2019    Gastroesophageal reflux disease without esophagitis 2019    Anxiety 2019    Family history of colon cancer requiring screening colonoscopy 2019       Past Medical History:    Anemia    PT TOOK IRON PILLS; PRIOR TO PREGNANCY- RESOLVED    Anxiety state    Depression    Hypercholesterolemia    Nausea & vomiting       Past Surgical History:   Procedure Laterality Date    Colonoscopy N/A 2019    Procedure: COLONOSCOPY;  Surgeon: Florina Barrett MD;  Location: [facility] ENDOSCOPY    Colonoscopy         Prescriptions Prior to Admission[1]  Current Medications and Prescriptions Ordered in Epic[2]    Allergies[3]    Family History   Problem Relation Age of Onset    Colon Cancer Mother 52         at 54    Kidney Disease Father         dialysis    Colon Cancer Father 55    Diabetes Father     Renal Disease Father     Melanoma Father         in situ    No Known Problems Sister     No Known Problems Sister     Melanoma Brother         in situ    Uterine Cancer Other     Colon Cancer Maternal Great-Grandfather      Social History     Socioeconomic History     Marital status:    Occupational History    Occupation: insurance   Tobacco Use    Smoking status: Never    Smokeless tobacco: Never   Vaping Use    Vaping status: Never Used   Substance and Sexual Activity    Alcohol use: Not Currently    Drug use: Never    Sexual activity: Yes     Partners: Male     Birth control/protection: Pill   Other Topics Concern    Exercise Yes    Grew up on a farm No    History of tanning Yes    Outdoor occupation No    Breast feeding No    Reaction to local anesthetic No    Pt has a pacemaker No    Pt has a defibrillator No       Available pre-op labs reviewed.  Lab Results   Component Value Date    WBC 8.8 03/05/2025    RBC 3.87 03/05/2025    HGB 11.7 (L) 03/05/2025    HCT 33.3 (L) 03/05/2025    MCV 86.0 03/05/2025    MCH 30.2 03/05/2025    MCHC 35.1 03/05/2025    RDW 13.0 03/05/2025    .0 03/05/2025             Vital Signs:  Body mass index is 33.19 kg/m².   height is 1.803 m (5' 11\") and weight is 108 kg (238 lb). Her oral temperature is 98 °F (36.7 °C). Her blood pressure is 110/61 and her pulse is 60. Her respiration is 18.   Vitals:    03/05/25 0837 03/05/25 0930 03/05/25 1225 03/05/25 1330   BP:  113/78 117/72 110/61   Pulse:  66 61 60   Resp:   18    Temp:   98 °F (36.7 °C)    TempSrc:   Oral    Weight: 108 kg (238 lb)      Height: 1.803 m (5' 11\")           Anesthesia Evaluation      Airway   Mallampati: I  TM distance: >3 FB  Neck ROM: full  Dental - Dentition appears grossly intact     Pulmonary - negative ROS and normal exam    breath sounds clear to auscultation  Cardiovascular - normal exam  (-) murmur    Rhythm: regular  Rate: normal    Neuro/Psych    (+)  anxiety/panic attacks,  depression      GI/Hepatic/Renal    (+) GERD    Endo/Other    Abdominal  - normal exam                 Anesthesia Plan:   ASA:  2  Plan:   Epidural  Plan Comments: Neuroaxial anesthesia was explained in details to the patient, addressing the technique, intended outcome and known  adverse events namely spinal or epidural bleeding, infection, post dural puncture headaches, complete spinal block with resulting cardiovascular and respiratory failure, block failure and or need for multiple attempts or switching to general anesthesia.         I have informed Whit Rios and/or legal guardian or family member of the nature of the anesthetic plan, benefits, risks including possible dental damage if relevant, major complications, and any alternative forms of anesthetic management.   All of the patient's questions were answered to the best of my ability. The patient desires the anesthetic management as planned.  Genaro Byers MD  3/5/2025 2:31 PM  Present on Admission:  **None**           [1]   Medications Prior to Admission   Medication Sig Dispense Refill Last Dose/Taking    prenatal vitamin with DHA 27-0.8-228 MG Oral Cap Take 1 capsule by mouth daily.   3/5/2025 Morning    lisdexamfetamine (VYVANSE) 50 MG Oral Cap Take 1 capsule (50 mg total) by mouth every morning. 30 capsule 0     Na Sulfate-K Sulfate-Mg Sulf (SUPREP BOWEL PREP KIT) 17.5-3.13-1.6 GM/177ML Oral Solution Take prep as directed by gastro office. May substitute with Trilyte/generic equivalent if needed. (Patient not taking: Reported on 4/10/2024) 1 each 0    [2]   Current Facility-Administered Medications Ordered in Epic   Medication Dose Route Frequency Provider Last Rate Last Admin    acetaminophen (Tylenol Extra Strength) tab 500 mg  500 mg Oral Q6H PRN Donna Maldonado MD        acetaminophen (Tylenol Extra Strength) tab 1,000 mg  1,000 mg Oral Q6H PRN Donna Maldonado MD        ibuprofen (Motrin) tab 600 mg  600 mg Oral Once PRN Donna Maldonado MD        ondansetron (Zofran) 4 MG/2ML injection 4 mg  4 mg Intravenous Q6H PRN Donna Maldonado MD        oxyTOCIN in sodium chloride 0.9% (Pitocin) 30 Units/500mL infusion premix  62.5-900 parveen-units/min Intravenous Continuous Donna Maldonado MD         terbutaline (Brethine) 1 MG/ML injection 0.25 mg  0.25 mg Subcutaneous PRN Donna Maldonado MD        sodium citrate-citric acid (Bicitra) 500-334 MG/5ML oral solution 30 mL  30 mL Oral PRN Donna Maldonado MD        lidocaine PF (Xylocaine-MPF) 1% injection  30 mL Intradermal Once Donna Maldonado MD        lactated ringers infusion   Intravenous Continuous Donna Maldonado MD   Paused at 03/05/25 1334    dextrose in lactated ringers 5% infusion   Intravenous PRN Donna Maldonado MD        lactated ringers IV bolus 500 mL  500 mL Intravenous PRN Donna Maldonado MD        nalbuphine (Nubain) 10 mg/mL injection 10 mg  10 mg Intramuscular Q6H PRN Donna Maldonado MD        And    hydrOXYzine 50 mg/mL injection 50 mg  50 mg Intramuscular Q6H PRN Donna Maldonado MD        fentaNYL (Sublimaze) 50 mcg/mL injection 100 mcg  100 mcg Intravenous Once Donna Maldonado MD        fentaNYL (Sublimaze) 50 mcg/mL injection 50 mcg  50 mcg Intravenous Q30 Min PRN Donna Maldonado MD        oxyTOCIN in sodium chloride 0.9% (Pitocin) 30 Units/500mL infusion premix  0.5-20 parveen-units/min Intravenous Continuous Donna Maldonado MD 18 mL/hr at 03/05/25 1305 18 parveen-units/min at 03/05/25 1305    fentaNYL-bupivacaine 2 mcg/mL-0.125% in sodium chloride 0.9% 100 mL EPIDURAL infusion premix   Epidural Continuous Genaro Byers MD        fentaNYL (Sublimaze) 50 mcg/mL injection 100 mcg  100 mcg Epidural Once Genaro Byers MD        bupivacaine PF (Marcaine) 0.25% injection  20 mL Epidural Once Genaro Byers MD        ePHEDrine (PF) 25 MG/5 ML injection 5 mg  5 mg Intravenous PRN Genaro Byers MD        nalbuphine (Nubain) 10 mg/mL injection 2.5 mg  2.5 mg Intravenous Q15 Min PRN Genaro Byers MD         No current Epic-ordered outpatient medications on file.   [3] No Known Allergies

## 2025-03-05 NOTE — H&P
[facility]  part of [facility]    History & Physical    Whit Rios Patient Status:  Inpatient    6/15/1990 MRN [id number]   Location [facility] CENTER Attending Donna Maldonado MD   Hosp Day # 0 PCP Alphonse Sinclair MD     Date of Admission:  3/5/2025    Assessment/Plan:     Whit Rios is a 34 year old  female at 39w3d who presents to Labor and Delivery for scheduled induction of labor.    - Admit to Labor and Delivery  - Rh positive / GBS negative / Rubella Immune  - Hgb 11.7, plts 200 on admission  - Labor Induction/Augmentation plan: pitocin, AROM when able if needed  - Anesthesia consulted, epidural prn  - CFM    - Pregnancy complicated by: AMA, history of vaginal delivery x3      HPI:   Whit Rios is a 34 year old  female at 39w3d who presents to Labor and Delivery for scheduled induction of labor. She reports feeling well, no acute concerns.    Her current obstetrical history is significant for:    Patient Active Problem List   Diagnosis    Gastroesophageal reflux disease without esophagitis    Anxiety    Family history of colon cancer requiring screening colonoscopy    Iron deficiency    Rubella non-immune status, antepartum ([facility])    Encounter for supervision of normal pregnancy, antepartum ([facility])    Pregnancy ([facility])     (normal spontaneous vaginal delivery) ([facility])     (normal spontaneous vaginal delivery) ([facility])    Women's annual routine gynecological examination    Vaginal delivery ([facility])    Hypercholesterolemia           History   Obstetric History:   OB History    Para Term  AB Living   4 3 3 0 0 3   SAB IAB Ectopic Multiple Live Births   0 0 0 0 3      # Outcome Date GA Lbr Carlos/2nd Weight Sex Type Anes PTL Lv   4 Current            3 Term 10/05/23 37w6d 12:48 / 00:09 8 lb 6 oz (3.8 kg) M NORMAL SPONT EPI N LAMBERTO   2 Term 22 39w6d 09:55 / 00:11 9 lb 8.7 oz (4.33 kg) F NORMAL SPONT EPI N LAMBERTO      Complications: Group  B beta strep +   1 Term 08/08/20 40w3d 16:57 / 00:51 9 lb 5.9 oz (4.25 kg) F NORMAL SPONT EPI N LAMBERTO      Birth Comments: Passed Hearing Test        Past Medical History:   Past Medical History:    Anemia    PT TOOK IRON PILLS; PRIOR TO PREGNANCY- RESOLVED    Anxiety state    Depression    Hypercholesterolemia    Nausea & vomiting       Past Surgerical History:   Past Surgical History:   Procedure Laterality Date    Colonoscopy N/A 09/06/2019    Procedure: COLONOSCOPY;  Surgeon: Florina Barrett MD;  Location: [facility] ENDOSCOPY    Colonoscopy         Social History:   Social History     Tobacco Use    Smoking status: Never    Smokeless tobacco: Never   Substance Use Topics    Alcohol use: Not Currently        Allergies/Medications:   Allergies:   Allergies[1]    Medications:  Prescriptions Prior to Admission[2]      Review of Systems:   Pertinent positives noted in HPI      Physical Exam:   Temp:  [98 °F (36.7 °C)] 98 °F (36.7 °C)  Pulse:  [66-75] 66  Resp:  [16] 16  BP: (113-125)/(78-80) 113/78    General: alert and oriented, no acute distress  Abdomen: soft,  nontender, gravid  Cervix: 3/60/-3 per RN exam  FHT: 125 / moderate variability / positive accelerations / no decelerations  Extremities: normal range of motion  Psychiatric: appropriate          Donna Maldonado MD  3/5/2025  10:18 AM         [1] No Known Allergies  [2]   Medications Prior to Admission   Medication Sig Dispense Refill Last Dose/Taking    prenatal vitamin with DHA 27-0.8-228 MG Oral Cap Take 1 capsule by mouth daily.   3/5/2025 Morning    lisdexamfetamine (VYVANSE) 50 MG Oral Cap Take 1 capsule (50 mg total) by mouth every morning. 30 capsule 0     Na Sulfate-K Sulfate-Mg Sulf (SUPREP BOWEL PREP KIT) 17.5-3.13-1.6 GM/177ML Oral Solution Take prep as directed by gastro office. May substitute with Trilyte/generic equivalent if needed. (Patient not taking: Reported on 4/10/2024) 1 each 0

## 2025-03-06 NOTE — PROGRESS NOTES
Patient up to bathroom with assist x 2.  Voided 200ml at this time. Patient transferred to mother/baby room 349 per wheelchair in stable condition with baby and personal belongings.  Accompanied by significant other and staff.  Report given to Kaelyn mother/baby RN.

## 2025-03-06 NOTE — PROGRESS NOTES
Reason For Visit:   Chief Complaint   Patient presents with     Surgical Followup     8 week POP // Left elbow pin removal (Left) // DOS 10/11/2021.  DOS 21 S/P Left elbow closed reduction and percutaneous pinning       PCP: Shayne Miranda        Age: 5 year old  : 2016     Here with:Mom     Student in grade: 1st grade   ?  No     Date of injury: 21  Type of injury: Elbow fracture .  Date of surgery: 21   Type of surgery: Left elbow closed reduction and percutaneous pinning  Date of surgery: 10/11/2021  Type of surgery: Left elbow pin removal.  Smoker: No    There were no vitals taken for this visit.      Pain Assessment  Patient Currently in Pain: Venus Godinez, ATC         Received patient into room 349 via WC . Bedside shift report received from KAMAR Liang. Pt transferred to bed. Bed in lowest and locked position. Side rails up x2. VSS. IV site unremarkable. Baby present in open crib.  ID bands matched with L&D RN.  Patient and family oriented to unit, room and call light within reach.  Safety measures in place, POC followed. Will continue to monitor per protocol.     Advised to call for assistance to bathroom.

## 2025-03-06 NOTE — ANESTHESIA POSTPROCEDURE EVALUATION
Patient: Whit Rios    Procedure Summary       Date: 03/05/25 Room / Location:     Anesthesia Start: 1500 Anesthesia Stop: 1800    Procedure: LABOR ANALGESIA Diagnosis:     Scheduled Providers:  Anesthesiologist: Genaro Byers MD    Anesthesia Type: epidural ASA Status: 2            Anesthesia Type: No value filed.    Vitals Value Taken Time   /63 03/05/25 1846   Temp 98.8 03/05/25 1906   Pulse 75 03/05/25 1846   Resp 12 03/05/25 1906   SpO2 97 % 03/05/25 1830       [facility] AN Post Evaluation:   Patient Evaluated in PACU  Patient Participation: complete - patient participated  Level of Consciousness: awake  Pain Score: 2  Pain Management: adequate  Airway Patency:patent  Dental exam unchanged from preop  Yes    Cardiovascular Status: hemodynamically stable  Respiratory Status: spontaneous ventilation  Postoperative Hydration euvolemic      Genaro Byers MD  3/5/2025 7:06 PM

## 2025-03-06 NOTE — PLAN OF CARE
Problem: Patient Centered Care  Goal: Patient preferences are identified and integrated in the patient's plan of care  Description: Interventions:  - What would you like us to know as we care for you?   - Provide timely, complete, and accurate information to patient/family  - Incorporate patient and family knowledge, values, beliefs, and cultural backgrounds into the planning and delivery of care  - Encourage patient/family to participate in care and decision-making at the level they choose  - Honor patient and family perspectives and choices  Outcome: Progressing     Problem: POSTPARTUM  Goal: Long Term Goal:Experiences normal postpartum course  Description: INTERVENTIONS:  - Assess and monitor vital signs and lab values.  - Assess fundus and lochia.  - Provide ice/sitz baths for perineum discomfort.  - Monitor healing of incision/episiotomy/laceration, and assess for signs and symptoms of infection and hematoma.  - Assess bladder function and monitor for bladder distention.  - Provide/instruct/assist with pericare as needed.  - Provide VTE prophylaxis as needed.  - Monitor bowel function.  - Encourage ambulation and provide assistance as needed.  - Assess and monitor emotional status and provide social service/psych resources as needed.  - Utilize standard precautions and use personal protective equipment as indicated. Ensure aseptic care of all intravenous lines and invasive tubes/drains.  - Obtain immunization and exposure to communicable diseases history.  Outcome: Progressing  Goal: Optimize infant feeding at the breast  Description: INTERVENTIONS:  - Initiate breast feeding within first hour after birth.   - Monitor effectiveness of current breast feeding efforts.  - Assess support systems available to mother/family.  - Identify cultural beliefs/practices regarding lactation, letdown techniques, maternal food preferences.  - Assess mother's knowledge and previous experience with breast feeding.  - Provide  information as needed about early infant feeding cues (e.g., rooting, lip smacking, sucking fingers/hand) versus late cue of crying.  - Discuss/demonstrate breast feeding aids (e.g., infant sling, nursing footstool/pillows, and breast pumps).  - Encourage mother/other family members to express feelings/concerns, and actively listen.  - Educate father/SO about benefits of breast feeding and how to manage common lactation challenges.  - Recommend avoidance of specific medications or substances incompatible with breast feeding.  - Assess and monitor for signs of nipple pain/trauma.  - Instruct and provide assistance with proper latch.  - Review techniques for milk expression (breast pumping) and storage of breast milk. Provide pumping equipment/supplies, instructions and assistance, as needed.  - Encourage rooming-in and breast feeding on demand.  - Encourage skin-to-skin contact.  - Provide LC support as needed.  - Assess for and manage engorgement.  - Provide breast feeding education handouts and information on community breast feeding support.   Outcome: Progressing  Goal: Establishment of adequate milk supply with medication/procedure interruptions  Description: INTERVENTIONS:  - Review techniques for milk expression (breast pumping).   - Provide pumping equipment/supplies, instructions, and assistance until it is safe to breastfeed infant.  Outcome: Progressing  Goal: Experiences normal breast weaning course  Description: INTERVENTIONS:  - Assess for and manage engorgement.  - Instruct on breast care.  - Provide comfort measures.  Outcome: Progressing  Goal: Appropriate maternal -  bonding  Description: INTERVENTIONS:  - Assess caregiver- interactions.  - Assess caregiver's emotional status and coping mechanisms.  - Encourage caregiver to participate in  daily care.  - Assess support systems available to mother/family.  - Provide /case management support as needed.  Outcome:  Progressing     Discharge instructions reviewed with pt. Pt verbalized understanding regarding postpartum home instructions, importance of follow up appointments, s/s of PPH and Pre-E to continue to monitor for at home. IV heplock removed. Pt wheeled to main lobby and driven home by family member.

## 2025-03-06 NOTE — LACTATION NOTE
03/06/25 1055   Evaluation Type   Evaluation Type Inpatient   Problems identified   Problems identified Knowledge deficit   Maternal history   Maternal history Depression;Anxiety   Breastfeeding goal   Breastfeeding goal To maintain breast milk feeding per patient goal   Maternal Assessment   Bilateral Breasts Symmetrical;Soft   Bilateral Nipples Everted   Left Nipple Colostrum easily expressed   Prior breastfeeding experience (comment below) Multip;Pumped & bottle fed;Problems, continued   Prior BF experience: comment hx low milk supply.   Breastfeeding Assistance Breastfeeding assistance provided with permission;Pumping assistance provided with permission;Breast exam provided with permission;Hand expression provided with permission   Pain assessment   Treatment of Sore Nipples Deeper latch techniques;Expressed breast milk   Guidelines for use of:   Breast pump type Medela Pump In Style MaxFlow;Spectra;Ameda Platinum   Suggested use of pump Pump if infant is not latching to breast;For comfort as needed;Pump each time a supplement is offered   Other (comment) LC assistance offered. mother has a history of low supply with her 3 other children and is worried about supply with this one. Mother has been using her medela pump and can pump up to 30 minutes. LC encouraged mother to pump no more than 15 minutes and to pump if it is needed. LC offered mother to pump after the feedings if she feels that she needs to, but it is not necessary at the moment. LC educated on skin to skin benefits, feeding patterns of a baby under 24 hours old, expected I&O's, feeding frequency/duration, and LC provided patients with paper on supplementation guidelines. LC provided patient with ameda pump and reviewed pumping guidelines/settings. LC then attempted a latch on the right side in laid back positon. LC provided position adjustments for mother and baby. Small sucking bursts noted but infant would quickly fall asleep. LC encouraged  mother to continue stimulating baby during the feeds. LC demonstrated hand expression. Maevee requesting LC to come back for a feeding later int he afternoon. All questions asnwered. outpatient information reviewed.

## 2025-03-06 NOTE — DISCHARGE INSTRUCTIONS
-Pelvic rest for 6 weeks; no sex, tampons, douching, baths, or pools until after 6 weeks check-up.  -No heavy lifting; increase activity gradually.  -No driving if taking narcotics.    CALL YOUR PROVIDER IF:  Increased/heavy bleeding (I.e. Changing a saturated pad every hour).  New onset chills/fever greater than 100.4.  Pain in your vagina or abdomen that gets worse and isn't relieved with medicine.  Swelling or discharge with a bad odor from vagina.  Burning, pain, red streaks, or lumpy areas in your breasts that may be accompanied by flu-like symptoms.  Painful urination, or inability to control urination.  Nausea, vomiting, dizziness, or fainting.  Feelings of extreme sadness or anxiety, or a feeling that you don’t want to be with your baby.  Redness, warmth, or pain in the lower leg.  Chest pain or shortness of breath.  If : Check incision site AT LEAST 2x daily for signs and symptoms of infection (increased redness, warmth, tenderness, or drainage)    Mom & Baby Hour: Meets in person every WEDNESDAY at 10am at the [facility] in [place] ([address]) in the community education room. The group is for new moms and their babies up to 6 months of age. It includes breastfeeding supports with a certified lactation educator to be available to answer your breastfeeding questions.

## 2025-03-06 NOTE — PROGRESS NOTES
[facility]  part of [facility]    OB/Gyne Post  Progress Note      Whit Rios Patient Status:  Inpatient    6/15/1990 MRN [id number]   Location [facility] 3SE Attending Donna Maldonado MD   Hosp Day # 1 PCP Alphonse Sinclair MD       Subjective     Pt denies N/V/F/C/CP/SOB/palpitations, dizziness, headache, blurry vision, leg pain/calf pain.       Good pain control.  She reports no pain.  Tolerating present diet.   Ambulating well. Voiding freely.  Breastfeeding: Yes States she has never had an over abundance of supply.  She usually needs to supplement.  Vaginal bleeding: Pt reports present and not bad, like a period during her heavy days.    Objective   Vital signs in last 24 hours:  Temp:  [97.3 °F (36.3 °C)-98.2 °F (36.8 °C)] 97.6 °F (36.4 °C)  Pulse:  [55-75] 55  Resp:  [16-18] 16  BP: ()/(47-80) 111/65  SpO2:  [97 %-100 %] 97 %    Input/Output:    Intake/Output Summary (Last 24 hours) at 3/6/2025 0918  Last data filed at 3/5/2025 2200  Gross per 24 hour   Intake 2111.05 ml   Output 1583 ml   Net 528.05 ml         Constitutional: comfortable  Abdomen: soft, nontender, nondistended  Uterus: fundus firm and 1 cm below umbilicus,   Extremities: No calf tenderness; no c/c/e      Results:   Labs / Path / Radiology:    Recent Results (from the past 24 hours)   CBC With Differential With Platelet    Collection Time: 25  6:10 AM   Result Value Ref Range    WBC 8.9 4.0 - 11.0 x10(3) uL    RBC 3.81 3.80 - 5.30 x10(6)uL    HGB 11.5 (L) 12.0 - 16.0 g/dL    HCT 33.8 (L) 35.0 - 48.0 %    MCV 88.7 80.0 - 100.0 fL    MCH 30.2 26.0 - 34.0 pg    MCHC 34.0 31.0 - 37.0 g/dL    RDW-SD 42.2 35.1 - 46.3 fL    RDW 13.0 11.0 - 15.0 %    .0 150.0 - 450.0 10(3)uL    Neutrophil Absolute Prelim 5.87 1.50 - 7.70 x10 (3) uL    Neutrophil Absolute 5.87 1.50 - 7.70 x10(3) uL    Lymphocyte Absolute 1.71 1.00 - 4.00 x10(3) uL    Monocyte Absolute 1.13 (H) 0.10 - 1.00 x10(3) uL     Eosinophil Absolute 0.06 0.00 - 0.70 x10(3) uL    Basophil Absolute 0.03 0.00 - 0.20 x10(3) uL    Immature Granulocyte Absolute 0.11 0.00 - 1.00 x10(3) uL    Neutrophil % 65.9 %    Lymphocyte % 19.2 %    Monocyte % 12.7 %    Eosinophil % 0.7 %    Basophil % 0.3 %    Immature Granulocyte % 1.2 %       Specimens (From admission, onward)      None            No results found.      Assessment/Plan   34 year oldyo  , s/p induced vaginal, PPD# 1       Patient Active Problem List   Diagnosis    Gastroesophageal reflux disease without esophagitis    Anxiety    Family history of colon cancer requiring screening colonoscopy    Iron deficiency    Rubella non-immune status, antepartum ([facility])    Encounter for supervision of normal pregnancy, antepartum ([facility])    Pregnancy ([facility])     (normal spontaneous vaginal delivery) ([facility])     (normal spontaneous vaginal delivery) ([facility])    Women's annual routine gynecological examination    Vaginal delivery ([facility])    Hypercholesterolemia   .    Postpartum:  -Pt doing well  -Pain tolerable and controlled  -Breastfeeding, lactation consultant available fo assistance    2. Anemia:  Hgb s/p delivery 11.5  Most likelly 2/2 delivery  Asymptomatic and hemodynamically stable  Will encourage cont PNV and increase intake of iron rich foods    3. Disposition:  Pt comfortable about going home, discharge home today  Rx for motrin given  Home instructions given and pt verbalized understanding  Pt to call the office and schedule an appt in 6 wks for PP visit  If any concerns or questions arise, pt to call the office and make an appt sooner for reevaluation.          Ling Pope MD  3/6/2025  9:18 AM

## 2025-03-06 NOTE — L&D DELIVERY NOTE
Vaginal Delivery Note    Patient complete and precipitously delivering with contractions. Head delivered ALEXEY over perineum followed without hesitation or delay by anterior shoulder and remainder of infant body.  Viable female infant. APGARS as below. Cord cut and clamped at 60 seconds. Cord gases and cord blood collected. Placenta delivered intact with fundal massage. Uterus explored noting adequate tone. Small, first degree laceration noted and repaired in usual fashion with 3-0 vicryl. Patient tolerated procedure well. QBL 108cc.      Gabriel Girl [[id number]]      Labor Events     labor?: No   steroids?: None  Antibiotics received during labor?: No  Rupture date/time: 3/5/2025 1528     Rupture type: AROM  Fluid color: Clear  Labor type: Induced Onset of Labor  Induction: Oxytocin, AROM  Indications for induction: Other - comment  Induction comment: AMA       Labor Event Times    Labor onset date/time: 3/5/2025 1310  Dilation complete date/time: 3/5/2025 1751  Start pushing date/time: 3/5/2025 1755        Presentation    Presentation: Vertex  Position: Left Occiput Anterior       Operative Delivery    Operative Vaginal Delivery: No                Shoulder Dystocia    Shoulder Dystocia: No       Anesthesia    Method: Epidural              [place] Delivery      Head delivery date/time: 3/5/2025 17:56:04   Delivery date/time:  3/5/25 17:56:12   Delivery type: Normal spontaneous vaginal delivery    Details:     Delivery location: delivery room  Delivery Room Temperature: 73       Delivery Providers    Delivering Clinician: Donna Maldonado MD   Delivery personnel:  Provider Role   Viky Herndon, RN Baby Nurse   Samara Roa, RN Delivery Nurse             Cord    Vessels: 3 Vessels  Complications: Nuchal  # of loops: 1  Timed cord clamping: Yes  Time in sec: 60  Cord blood disposition: to lab  Gases sent?: Yes       Resuscitation    Method: None       [place] Measurements      Weight:  4060 g 8 lb 15.2 oz Length: 55.9 cm     Head circum.: 36.5 cm              Placenta    Date/time: 3/5/2025 1800  Removal: Spontaneous  Appearance: Intact  Disposition: Discarded       Apgars    Living status: Living   Apgar Scoring Key:    0 1 2    Skin color Blue or pale Acrocyanotic Completely pink    Heart rate Absent <100 bpm >100 bpm    Reflex irritability No response Grimace Cry or active withdrawal    Muscle tone Limp Some flexion Active motion    Respiratory effort Absent Weak cry; hypoventilation Good, crying              1 Minute:  5 Minute:  10 Minute:  15 Minute:  20 Minute:      Skin color: 0  1       Heart rate: 2  2       Reflex irritablity: 2  2       Muscle tone: 2  2       Respiratory effort: 2  2       Total: 8  9          Apgars assigned by: FIDENCIO GALVIN   disposition: with mother       Skin to Skin    Skin to skin initiated date/time: 3/5/2025 1756  Skin to skin with: Mother       Vaginal Count    Initial count RN: Samara Roa RN  Initial count Tech: Fitzgerald, Staci   Sponges   Sharps    Initial counts 10   0    Final counts 10   1    Final count RN: Samara Roa RN  Final count MD: Donna Maldonado MD       Lacerations    Episiotomy: None  Perineal lacerations: 1st Repaired?: Yes     Vaginal laceration?: No      Cervical laceration?: No    Clitoral laceration?: No    Quantitative blood loss (mL): 108

## 2025-03-06 NOTE — DISCHARGE SUMMARY
[facility]  part of [facility]    Discharge Summary    Whit Rios Patient Status:  Inpatient    6/15/1990 MRN [id number]   Location [facility] 3SE Attending Donna Munoz MD   Hosp Day # 1 PCP Alphonse Sinclair MD     Date of Admission: 3/5/2025    Admission Diagnoses: pregnancy  Pregnancy (HCC)    Date of Discharge: 3/6/2025    Discharge Diagnoses:S/P Vaginal Delivery    Episode Diagnoses:   Pregnancy Problems (from 25 to present)       No problems associated with this episode.                  Hospital Course:     EDC: Estimated Date of Delivery: 3/9/25    Gestational Age: 39w3d    Date of Delivery: 3/5/2025  Time of Delivery: 5:56 PM    Antepartum complications: Pregnancy complicated by AMA and BMI 33    Delivered By: DONNA MUNOZ    Delivery Method: Normal spontaneous vaginal delivery    Delivery Procedures:     Baby: female      Apgars:  1 minute:   8                 5 minutes: 9                          10 minutes:      Feeding Method:The patient is currently breastfeeding.     Intrapartum Complications: Pt presented to L&D for IOL and underwent an uncomplicated .  Refer to L&D delivery note.    Lacerations      Perineal lacerations: 1st Repaired?: Yes     Vaginal laceration?: No      Cervical laceration?: No    Clitoral laceration?: No             Episiotomy: None    Placenta: Spontaneous    Postpartum complications: Anemia                  Discharge Plan:   Discharge Condition: Good    Discharge medications:  Current Discharge Medication List        New Orders    Details   ibuprofen 600 MG Oral Tab Take 1 tablet (600 mg total) by mouth every 6 (six) hours as needed for Pain (moderate pain).           Home Meds - Unchanged    Details   prenatal vitamin with DHA 27-0.8-228 MG Oral Cap Take 1 capsule by mouth daily.                   Discharge Diet: As tolerated    Discharge Activity: Pelvic rest until cleared  further discharge instructions given to  patient while  in the room.    Follow up:     Follow Up in the Office: 6 weeks for postpartum visit     Follow-up Information       Donna Maldonado MD Follow up in 6 week(s).    Specialty: OBSTETRICS & GYNECOLOGY  Why: Call the office to schedule an appointment 6 weeks postpartum.  If you have questions or concerns sooner feel free to call the office.  Contact information:  [address] [place] [address] [id number] [phone]                                     Ling Pope MD  3/6/2025

## 2025-03-06 NOTE — PAYOR COMM NOTE
ADMISSION REVIEW     Payor: [employer] CHOICE/HMO/POS/EPO  Subscriber #:  [id number]  Authorization Number: [id number]    Admit date: 3/5/25  Admit time:  8:00 AM       3/5 3/6 REVIEW DOCUMENTATION:  ED Provider Notes    No notes of this type exist for this encounter.         3/5 OBGYN:          History & Physical           Whit Rios Patient Status:  Inpatient    6/15/1990 MRN [id number]   Location [facility] Attending Donna Maldonado MD   Hosp Day # 0 PCP Alphonse Sinclair MD      Date of Admission:  3/5/2025     Assessment/Plan:      Whit Rios is a 34 year old  female at 39w3d who presents to Labor and Delivery for scheduled induction of labor.     - Admit to Labor and Delivery  - Rh positive / GBS negative / Rubella Immune  - Hgb 11.7, plts 200 on admission  - Labor Induction/Augmentation plan: pitocin, AROM when able if needed  - Anesthesia consulted, epidural prn  - CFM     - Pregnancy complicated by: AMA, history of vaginal delivery x3         HPI:   Whit Rios is a 34 year old  female at 39w3d who presents to Labor and Delivery for scheduled induction of labor. She reports feeling well, no acute concerns.     Her current obstetrical history is significant for:        Patient Active Problem List   Diagnosis    Gastroesophageal reflux disease without esophagitis    Anxiety    Family history of colon cancer requiring screening colonoscopy    Iron deficiency    Rubella non-immune status, antepartum (HCC)    Encounter for supervision of normal pregnancy, antepartum ([facility])    Pregnancy ([facility])     (normal spontaneous vaginal delivery) ([facility])     (normal spontaneous vaginal delivery) ([facility])    Women's annual routine gynecological examination    Vaginal delivery ([facility])    Hypercholesterolemia               History   Obstetric History:                    OB History    Para Term  AB Living   4 3 3 0 0 3   SAB IAB Ectopic Multiple Live  Births      0 0 0 0 3          # Outcome Date GA Lbr Carlos/2nd Weight Sex Type Anes PTL Lv   4 Current                     3 Term 10/05/23 37w6d 12:48 / 00:09 8 lb 6 oz (3.8 kg) M NORMAL SPONT EPI N LAMBERTO   2 Term 04/03/22 39w6d 09:55 / 00:11 9 lb 8.7 oz (4.33 kg) F NORMAL SPONT EPI N LAMBERTO      Complications: Group B beta strep +   1 Term 08/08/20 40w3d 16:57 / 00:51 9 lb 5.9 oz (4.25 kg) F NORMAL SPONT EPI N LAMBERTO      Birth Comments: Passed Hearing Test          Past Medical History:   Past Medical History       Past Medical History:    Anemia     PT TOOK IRON PILLS; PRIOR TO PREGNANCY- RESOLVED    Anxiety state    Depression    Hypercholesterolemia    Nausea & vomiting            Past Surgerical History:   Past Surgical History         Past Surgical History:   Procedure Laterality Date    Colonoscopy N/A 09/06/2019     Procedure: COLONOSCOPY;  Surgeon: Florina Barrett MD;  Location: [facility] ENDOSCOPY    Colonoscopy                Social History:   Social History           Tobacco Use    Smoking status: Never    Smokeless tobacco: Never   Substance Use Topics    Alcohol use: Not Currently         Allergies/Medications:   Allergies:   [Allergies]    [Allergies]  No Known Allergies     Medications:  [Prescriptions Prior to Admission]    [Prescriptions Prior to Admission]          Medications Prior to Admission   Medication Sig Dispense Refill Last Dose/Taking    prenatal vitamin with DHA 27-0.8-228 MG Oral Cap Take 1 capsule by mouth daily.     3/5/2025 Morning    lisdexamfetamine (VYVANSE) 50 MG Oral Cap Take 1 capsule (50 mg total) by mouth every morning. 30 capsule 0      Na Sulfate-K Sulfate-Mg Sulf (SUPREP BOWEL PREP KIT) 17.5-3.13-1.6 GM/177ML Oral Solution Take prep as directed by gastro office. May substitute with Trilyte/generic equivalent if needed. (Patient not taking: Reported on 4/10/2024) 1 each 0             Review of Systems:   Pertinent positives noted in HPI        Physical Exam:   Temp:  [98 °F (36.7 °C)] 98  °F (36.7 °C)  Pulse:  [66-75] 66  Resp:  [16] 16  BP: (113-125)/(78-80) 113/78     General: alert and oriented, no acute distress  Abdomen: soft,  nontender, gravid  Cervix: 3/60/-3 per RN exam  FHT: 125 / moderate variability / positive accelerations / no decelerations  Extremities: normal range of motion  Psychiatric: appropriate              3/5 .OBGYN:          L&D Delivery Note     Signed     Date of Service: 3/5/2025  6:17 PM     Signed         Vaginal Delivery Note     Patient complete and precipitously delivering with contractions. Head delivered ALEXEY over perineum followed without hesitation or delay by anterior shoulder and remainder of infant body.  Viable female infant. APGARS as below. Cord cut and clamped at 60 seconds. Cord gases and cord blood collected. Placenta delivered intact with fundal massage. Uterus explored noting adequate tone. Small, first degree laceration noted and repaired in usual fashion with 3-0 vicryl. Patient tolerated procedure well. QBL 108cc.        Gabriel, Girl [[id number]]       Labor Events     labor?: No   steroids?: None  Antibiotics received during labor?: No  Rupture date/time: 3/5/2025 1528     Rupture type: AROM  Fluid color: Clear  Labor type: Induced Onset of Labor  Induction: Oxytocin, AROM  Indications for induction: Other - comment  Induction comment: AMA         Labor Event Times    Labor onset date/time: 3/5/2025 1310  Dilation complete date/time: 3/5/2025 175  Start pushing date/time: 3/5/2025 1755                [place] Presentation    Presentation: Vertex  Position: Left Occiput Anterior         Operative Delivery    Operative Vaginal Delivery: No                          Shoulder Dystocia    Shoulder Dystocia: No         Anesthesia    Method: Epidural                     Delivery            Head delivery date/time: 3/5/2025 17:56:04   Delivery date/time:  3/5/25 17:56:12   Delivery type: Normal spontaneous vaginal delivery      Details:      Delivery location: delivery room  Delivery Room Temperature: 73         Delivery Providers    Delivering Clinician: Donna Maldonado MD    Delivery personnel:  Provider Role   Fidencio Herndon RN Baby Nurse   Samara Roa RN Delivery Nurse                Cord    Vessels: 3 Vessels  Complications: Nuchal  # of loops: 1  Timed cord clamping: Yes  Time in sec: 60  Cord blood disposition: to lab  Gases sent?: Yes         Resuscitation    Method: None         [place] Measurements       Weight: 4060 g 8 lb 15.2 oz Length: 55.9 cm      Head circum.: 36.5 cm                   Placenta    Date/time: 3/5/2025 1800  Removal: Spontaneous  Appearance: Intact  Disposition: Discarded         Apgars    Living status: Living    Apgar Scoring Key:    0 1 2     Skin color Blue or pale Acrocyanotic Completely pink     Heart rate Absent <100 bpm >100 bpm     Reflex irritability No response Grimace Cry or active withdrawal     Muscle tone Limp Some flexion Active motion     Respiratory effort Absent Weak cry; hypoventilation Good, crying                1 Minute:  5 Minute:  10 Minute:  15 Minute:  20 Minute:       Skin color: 0  1          Heart rate: 2  2          Reflex irritablity: 2  2          Muscle tone: 2  2          Respiratory effort: 2  2          Total: 8  9             Apgars assigned by: FIDENCIO GALVIN   disposition: with mother         Skin to Skin    Skin to skin initiated date/time: 3/5/2025 1756  Skin to skin with: Mother         Vaginal Count    Initial count RN: Samara Roa RN  Initial count Tech: Fitzgerald, Staci    Sponges     Sharps     Initial counts 10     0     Final counts 10     1     Final count RN: Samara Roa RN  Final count MD: Donna Maldonado MD         Lacerations    Episiotomy: None  Perineal lacerations: 1st Repaired?: Yes      Vaginal laceration?: No        Cervical laceration?: No     Clitoral laceration?: No     Quantitative blood loss (mL): 108                         3/6 OBGYN:             Subjective      Pt denies N/V/F/C/CP/SOB/palpitations, dizziness, headache, blurry vision, leg pain/calf pain.        Good pain control.  She reports no pain.  Tolerating present diet.   Ambulating well. Voiding freely.  Breastfeeding: Yes States she has never had an over abundance of supply.  She usually needs to supplement.  Vaginal bleeding: Pt reports present and not bad, like a period during her heavy days.     Objective   Vital signs in last 24 hours:  Temp:  [97.3 °F (36.3 °C)-98.2 °F (36.8 °C)] 97.6 °F (36.4 °C)  Pulse:  [55-75] 55  Resp:  [16-18] 16  BP: ()/(47-80) 111/65  SpO2:  [97 %-100 %] 97 %           Assessment/Plan   34 year oldyo  , s/p induced vaginal, PPD# 1            Patient Active Problem List   Diagnosis    Gastroesophageal reflux disease without esophagitis    Anxiety    Family history of colon cancer requiring screening colonoscopy    Iron deficiency    Rubella non-immune status, antepartum ([facility])    Encounter for supervision of normal pregnancy, antepartum ([facility])    Pregnancy ([facility])     (normal spontaneous vaginal delivery) ([facility])     (normal spontaneous vaginal delivery) ([facility])    Women's annual routine gynecological examination    Vaginal delivery ([facility])    Hypercholesterolemia   .     Postpartum:  -Pt doing well  -Pain tolerable and controlled  -Breastfeeding, lactation consultant available fo assistance     2. Anemia:  Hgb s/p delivery 11.5  Most likelly 2/2 delivery  Asymptomatic and hemodynamically stable  Will encourage cont PNV and increase intake of iron rich foods     3. Disposition:  Pt comfortable about going home, discharge home today  Rx for motrin given  Home instructions given and pt verbalized understanding  Pt to call the office and schedule an appt in 6 wks for PP visit  If any concerns or questions arise, pt to call the office and make an appt sooner for reevaluation.              Ling Pope MD  3/6/2025  9:18 AM         MEDICATIONS  ADMINISTERED IN LAST 1 DAY:  acetaminophen (Tylenol Extra Strength) tab 1,000 mg       Date Action Dose Route User    3/6/2025 0540 Given 1,000 mg Oral GómezKaelyn atkins RN    3/5/2025 2318 Given 1,000 mg Oral GómezKaelyn atkins RN          benzocaine-menthol (Dermoplast) 20-0.5 % topical spray 1 spray       Date Action Dose Route User    3/6/2025 0342 Given 1 spray Topical GómezKaelyn atkins RN          docusate sodium (Colace) cap 100 mg       Date Action Dose Route User    3/6/2025 0540 Given 100 mg Oral GómezKaelyn atkins RN          ibuprofen (Motrin) tab 600 mg       Date Action Dose Route User    3/6/2025 1556 Given 600 mg Oral Sri Fagan RN    3/6/2025 0917 Given 600 mg Oral Sri Fagan RN    3/6/2025 0250 Given 600 mg Oral GómezKaelyn atkins RN    3/5/2025 1936 Given 600 mg Oral Azul Colón RN          lactated ringers infusion       Date Action Dose Route User    3/5/2025 1708 New Bag (none) Intravenous Samara Roa RN          oxyTOCIN in sodium chloride 0.9% (Pitocin) 30 Units/500mL infusion premix       Date Action Dose Route User    3/5/2025 1900 Rate/Dose Change 62.5 parveen-units/min Intravenous Samara Roa, RN    3/5/2025 1800 Rate/Dose Change 300 parveen-units/min Intravenous Samara Roa RN          oxyTOCIN in sodium chloride 0.9% (Pitocin) 30 Units/500mL infusion premix       Date Action Dose Route User    3/5/2025 1735 Rate/Dose Change 20 parveen-units/min Intravenous Samara Roa RN    3/5/2025 1720 Rate/Dose Change 18 parveen-units/min Intravenous Samara Roa, RN    3/5/2025 1705 Rate/Dose Change 16 parveen-units/min Intravenous Samara Roa, RN    3/5/2025 1640 Rate/Dose Change 14 parveen-units/min Intravenous Samara Roa, RN            Vitals (last day)       Date/Time Temp Pulse Resp BP SpO2 Weight O2 Device O2 Flow Rate (L/min) [facility]    03/06/25 0917 97.7 °F (36.5 °C) 63 16 115/66 -- -- None (Room air) -- ED    03/06/25 0615 97.6 °F (36.4 °C) 55 16 111/65 -- -- None (Room air) -- TT    03/06/25 0250 98.1 °F (36.7 °C)  55 16 106/70 -- -- None (Room air) -- TT    03/05/25 2318 97.9 °F (36.6 °C) 58 16 104/62 -- -- None (Room air) -- TT    03/05/25 2020 98.2 °F (36.8 °C) 61 16 113/61 -- -- None (Room air) -- TT    03/05/25 2000 -- 67 -- 94/70 -- -- -- -- GL    03/05/25 1945 -- 64 -- 105/61 -- -- -- -- GL    03/05/25 1930 -- 75 -- 110/66 -- -- -- -- GL    03/05/25 1915 97.3 °F (36.3 °C) 59 18 108/61 -- -- None (Room air) -- GL    03/05/25 1900 -- 71 -- 112/62 -- -- -- -- TM    03/05/25 1845 -- 75 -- 111/63 -- -- -- -- TM    03/05/25 1830 -- 69 -- 105/64 97 % -- -- -- TM    03/05/25 1815 -- 70 -- 100/47 99 % -- -- -- TM    03/05/25 1800 -- 74 18 120/76 100 % -- -- -- TM    03/05/25 1745 -- 69 -- 132/80 99 % -- -- -- TM    03/05/25 1720 97.7 °F (36.5 °C) -- -- -- -- -- -- -- TM    03/05/25 1715 -- 58 -- 110/67 99 % -- -- -- TM    03/05/25 1700 -- 60 -- 111/72 99 % -- -- -- TM    03/05/25 1645 -- 60 -- 95/48 99 % -- -- -- TM    03/05/25 1630 -- 57 -- 97/48 97 % -- -- -- TM    03/05/25 1615 -- 58 -- 97/49 100 % -- -- -- TM    03/05/25 1600 -- 71 -- 110/66 100 % -- -- -- TM    03/05/25 1545 -- 68 -- 107/64 100 % -- -- -- TM    03/05/25 1530 97.5 °F (36.4 °C) 69 16 117/65 100 % -- -- -- TM    03/05/25 1515 -- 67 -- 104/52 100 % -- -- -- TM    03/05/25 1500 -- 69 -- 113/60 99 % -- -- -- TM    03/05/25 1453 -- 62 -- 102/58 -- -- -- -- TM    03/05/25 1451 -- 68 -- 105/54 100 % -- -- -- TM    03/05/25 1448 -- 59 -- 128/70 -- -- -- -- TM    03/05/25 1446 -- 65 -- 125/70 -- -- -- -- TM 03/05/25 1445 -- 68 -- -- 99 % -- -- -- TM    03/05/25 1330 -- 60 -- 110/61 -- -- -- -- TM    03/05/25 1225 98 °F (36.7 °C) 61 18 117/72 -- -- -- -- TM    03/05/25 0930 -- 66 -- 113/78 -- -- -- -- TM    03/05/25 0837 -- -- -- -- -- 238 lb (108 kg) -- -- TM    03/05/25 0815 98 °F (36.7 °C) 75 16 125/80 -- -- -- -- TM

## 2025-03-07 NOTE — LACTATION NOTE
03/06/25 1700   Evaluation Type   Evaluation Type Inpatient   Problems identified   Problems identified Knowledge deficit   Maternal history   Maternal history Depression;Anxiety   Breastfeeding goal   Breastfeeding goal To maintain breast milk feeding per patient goal   Maternal Assessment   Bilateral Breasts Symmetrical;Soft   Bilateral Nipples Everted   Guidelines for use of:   Other (comment) Mother called LC to bedside. Infant was latched onto the left side prior to LC arrival in laid back position. Non nutritive feeding pattern noted with a chomping pattern and no swallows. Mother not complaining of pain, but nipple was compressed when infant unlathced. Infant was still showing hunger cues, so LC assisted with the right side in football hold. Deep and sustained latch achieved with a more nutritive feeding pattern but still had moments where there were \"butterfly\" sucks and chomping. Audible swallows heard. Mother states this has been the best latch so far. All questions answered.

## 2025-03-21 NOTE — LACTATION NOTE
03/21/25 1221   Evaluation Type   Evaluation Type Outpatient Initial   Problems identified   Problems identified Knowledge deficit   Problems Identified Other History of low milk supply with other 3 children   Maternal history   Maternal history Depression;Anxiety   Breastfeeding goal   Breastfeeding goal To maintain breast milk feeding per patient goal   Maternal Assessment   Bilateral Breasts Soft;Symmetrical   Bilateral Nipples Everted;Colostrum easily expressed;Elastic   Prior breastfeeding experience (comment below) Multip   Prior BF experience: comment hx low milk supply.   Breastfeeding Assistance Breastfeeding assistance provided with permission   Pain assessment   Location/Comment denied

## 2025-03-21 NOTE — PROGRESS NOTES
Situation:    Whit would like to see if Erika's weight is increased as well as learning how to improve the latch and boost milk supply.    Background:    Whit has been mostly breastfeeding with an occasional bottle given, which is usually around 1 ounce.  She has been breast feeding over 8 times a day. She has been feeding her in 4 hours blocks of time throughout the night.  When she has occasionally pumped, she has gotten around 1-2 ounces.  With her other 3 children she has issues with under supply and is being proactive in having a better supply this time.     BW: 8 pounds 15.2 ounces (4060 grams)    Last wt: Monday 3/17, 8 pounds 7.5 ounces which was up from 8 pounds 3 ounces     Today's wt: 8 pounds 9.9 ounces (3910 grams)    Void/Stool: appropriate voids/ yellow seedy stools      Assessment:    Oral examination did not show any concerns, no lip blisters, no obvious oral restrictions, good latch to this LC's gloved finger, good lateralization of the tongue.    Breastfeeding: Whit put Azucena to her right breast first, minor adjustments to the latch were done, placed Azucena's one arm under the breast and straightened her body so her ear, shoulder and hip were in alignment.  Swallows were heard. Shown breast compression. She was able to maintain the latch with active swallowing for about 10 minutes and started to appear sleepy and was taken off.  No nipple compression seen.     Took 46ml from the right side.    She was then brought to the left side, this time with a better latch and better body placement, Whit was able to do breast compression and swallows heard.  Azucena was more sleepy on this side but did nurse about 10 minutes and came off the breast on her own.    Took 18ml from the left side.    Total 64ml.      Recommendations:    Pay close attention to Azucena's positioning at the breast with her ear, shoulder and hip in alignment, arm under your breast.  Utilize breast compression.  Consider increasing the  feeding time at night to every three hours and also just pumping at night and then daddy feeding baby.  Adding in an extra pump session may help too.  We discussed different kinds of supplements to try, such as moringa, oatmeal, fenugreek.  Websites:  [employer], lactation education resources.  It was my pleasure working with you and your beautiful baby.  Best wishes to you and your family in your breastfeeding journey.

## 2025-03-21 NOTE — LACTATION NOTE
This note was copied from a baby's chart.     03/21/25 1157   Evaluation Type   Evaluation Type Outpatient Initial   Problems & Assessment   Problems: comment/detail Mom has had low supply issues with her other 3 children   Infant Assessment Hunger cues present   Muscle tone Appropriate for GA   Feeding Assessment   Summary Current Feeding Breastfeeding with breast milk supplement;Breastfeeding with formula supplement   Breastfeeding Assessment Assisted with breastfeeding w/mother's permission;Sustained nutrititive latch w/audible swallows;Calm and ready to breastfeed;Coordinated suck/swallow;Tolerated feeding well;Deep latch achieved and observed   Breastfeeding lasted # of minutes 20   Breastfeeding Positions cross cradle;right breast;left breast   Latch 2   Audible Sucks/Swallows 2   Type of Nipple 2   Comfort (Breast/Nipple) 2   Hold (Positioning) 1   LATCH Score 9   Pre/Post Weights   Pre-Weight Right Breast (g) 3910   Post-Weight Right Breast (g) 3956   ml of milk, RT Brst 46   Pre-Weight Left Breast (g) 3956   Post-Weight Left Breast (g) 3974   ml of milk, LT Brst 18   ml of milk, total 64

## (undated) DEVICE — 3 ML SYRINGE LUER-LOCK TIP: Brand: MONOJECT

## (undated) DEVICE — LINE MNTR ADLT SET O2 INTMD

## (undated) DEVICE — 6 ML SYRINGE LUER-LOCK TIP: Brand: MONOJECT

## (undated) DEVICE — 35 ML SYRINGE REGULAR TIP: Brand: MONOJECT

## (undated) DEVICE — MEDI-VAC NON-CONDUCTIVE SUCTION TUBING 6MM X 1.8M (6FT.) L: Brand: CARDINAL HEALTH

## (undated) DEVICE — Device: Brand: DEFENDO AIR/WATER/SUCTION AND BIOPSY VALVE

## (undated) DEVICE — Device: Brand: CUSTOM PROCEDURE KIT

## (undated) NOTE — LETTER
[facility]  Administration of Anesthesia  1. I, Jessee Pickett, or _________________________________ acting on her behalf, (Patient) (Dependent/Representative) request to receive anesthesia for my pending procedure/operation/treatment. A physician (anesthesiologist) alone or an anesthesiologist working with a nurse anesthetist may administer my anesthesia. 2. I understand that my anesthesiologist is not an employee or agent of the hospital, but is an independent medical practitioner who has been permitted to use its facilities for the care and treatment of his/her patients. 3. I acknowledge that a physician from [facility] or their designate(s), recommended anesthesia for me using her/his medical judgment. The type(s) of anesthesia I may receive include:                a) General Anesthesia, b) Spinal/Epidural Anesthesia, c) Regional Anesthesia or d) Monitored Anesthesia Care. 4. If my spinal, regional or monitored anesthesia care (local) is not satisfactory for my comfort, or if my medical condition requires, I consent to the administration of general anesthesia. 5. I am aware that the practice of anesthesiology is not an exact science and that some foreseeable risks or consequences may occur. Some common risks/consequences include sore throat and hoarseness, nausea and vomiting, muscle soreness, backache, damage to the mouth/teeth/vocal cords and eye injury. I understand that more rare but serious potential risks of anesthesia include blood pressure changes, drug reactions, cardiac arrest, brain damage, paralysis or death. These risks apply to whether I have general, spinal/epidural, regional or monitored anesthesia care. 6. OBSTETRIC PATIENTS: Specific risks/consequences of spinal/epidural anesthesia may include itching, low blood pressure, difficulty urinating, slowing of the baby's heart rate and headache.  Rare risks include infections, high spinal block, spinal bleeding, seizure, cardiac arrest and death. 7. AWARENESS: I understand that it is possible (but unlikely) to have explicit memory of events from the operating room while under general anesthesia. 8. ELECTROCONVULSIVE THERAPY PATIENTS: This consent serves for all treatments in a single course of therapy. 9. I understand that I must inform my anesthesiologist when I last ate and/or drank to minimize the risk of anesthesia. 10. If I am pregnant, or may pregnant, I understand that elective surgery should be postponed until after the baby is born. Anesthetics cross the placenta and may temporarily anesthetize the baby. Although fetal complications of anesthesia during pregnancy are rare, they may include birth defects, premature labor, brain damage and death. 11. I certify that I informed the anesthesiologist, to the best of my ability, about medication I take including blood thinners, anticoagulants, herbal remedies, narcotics and recreational drugs (e.g. cocaine, marijuana, PCP). Failure to inform my anesthesiologist about these medicines may increase my risk of anesthetic complications. The nature and purpose of my anesthetic management was explained to me. I had the opportunity to ask questions and the answers and information provided meet my satisfaction.   I retain the right to withdraw this consent at any time prior to the administration of said anesthetic.    ___________________________________________________           _____________________________________________________  Patient Signature                                                                                      Witness Signature                ___________________________________________________           _____________________________________________________  Date/Time                                                                                               Responsible person in case of minor/ unconscious pt /Relationship    My signature below affirms that prior to the time of the procedure, I have explained to the patient and/or his/her guardian, the risks and benefits of undergoing anesthesia, as well as any reasonable alternatives.     ___________________________________________________            _____________________________________________________  Physician Signature                            Date/Time  Patient Name: Rupinder Mcgowan     : 6/15/1990     Printed: 4/3/2022      Medical Record #: [id number]                              Page 1 of 1    ----------ANESTHESIA CONSENT----------

## (undated) NOTE — LETTER
[facility]  Administration of Anesthesia  IWhit agree to be cared for by a physician anesthesiologist alone and/or with a nurse anesthetist, who is specially trained to monitor me and give me medicine to put me to sleep or keep me comfortable during my procedure    I understand that my anesthesiologist and/or anesthetist is not an employee or agent of [facility] or [facility]. He or she works for [facility]    As the patient asking for anesthesia services, I agree to:  Allow the anesthesiologist (anesthesia doctor) to give me medicine and do additional procedures as necessary. Some examples are: Starting or using an “IV” to give me medicine, fluids or blood during my procedure, and having a breathing tube placed to help me breathe when I’m asleep (intubation). In the event that my heart stops working properly, I understand that my anesthesiologist will make every effort to sustain my life, unless otherwise directed by [facility] Do Not Resuscitate documents.  Tell my anesthesia doctor before my procedure:  If I am pregnant.  The last time that I ate or drank.  iii. All of the medicines I take (including prescriptions, herbal supplements, and pills I can buy without a prescription (including street drugs/illegal medications). Failure to inform my anesthesiologist about these medicines may increase my risk of anesthetic complications.  iv.If I am allergic to anything or have had a reaction to anesthesia before.  I understand how the anesthesia medicine will help me (benefits).  I understand that with any type of anesthesia medicine there are risks:  The most common risks are: nausea, vomiting, sore throat, muscle soreness, damage to my eyes, mouth, or teeth (from breathing tube placement).  Rare risks include: remembering what happened during my procedure, allergic reactions to medications, injury to my airway, heart, lungs, vision, nerves, or  muscles and in extremely rare instances death.  My doctor has explained to me other choices available to me for my care (alternatives).  Pregnant Patients (“epidural”):  I understand that the risks of having an epidural (medicine given into my back to help control pain during labor), include itching, low blood pressure, difficulty urinating, headache or slowing of the baby’s heart. Very rare risks include infection, bleeding, seizure, irregular heart rhythms and nerve injury.  Regional Anesthesia (“spinal”, “epidural”, & “nerve blocks”):  I understand that rare but potential complications include headache, bleeding, infection, seizure, irregular heart rhythms, and nerve injury.    _____________________________________________________________________________  Patient (or Representative) Signature/Relationship to Patient  Date   Time    _____________________________________________________________________________   Name (if used)    Language/Organization   Time    _____________________________________________________________________________  Nurse Anesthetist Signature     Date   Time  _____________________________________________________________________________  Anesthesiologist Signature     Date   Time  I have discussed the procedure and information above with the patient (or patient’s representative) and answered their questions. The patient or their representative has agreed to have anesthesia services.    _____________________________________________________________________________  Witness        Date   Time  I have verified that the signature is that of the patient or patient’s representative, and that it was signed before the procedure  Patient Name: Whit Rios     : 6/15/1990                 Printed: 3/5/2025 at 8:01 AM    Medical Record #: [id number]                                            Page 1 of 1  ----------ANESTHESIA CONSENT----------